# Patient Record
Sex: FEMALE | Race: WHITE | URBAN - METROPOLITAN AREA
[De-identification: names, ages, dates, MRNs, and addresses within clinical notes are randomized per-mention and may not be internally consistent; named-entity substitution may affect disease eponyms.]

---

## 2017-01-09 DIAGNOSIS — F41.1 GAD (GENERALIZED ANXIETY DISORDER): Primary | ICD-10-CM

## 2017-01-24 ENCOUNTER — OFFICE VISIT (OUTPATIENT)
Dept: FAMILY MEDICINE | Facility: CLINIC | Age: 22
End: 2017-01-24

## 2017-01-24 VITALS
DIASTOLIC BLOOD PRESSURE: 73 MMHG | WEIGHT: 182 LBS | HEIGHT: 71 IN | BODY MASS INDEX: 25.48 KG/M2 | HEART RATE: 73 BPM | SYSTOLIC BLOOD PRESSURE: 121 MMHG

## 2017-01-24 DIAGNOSIS — F41.1 GAD (GENERALIZED ANXIETY DISORDER): Primary | ICD-10-CM

## 2017-01-24 RX ORDER — FLUOXETINE 40 MG/1
40 CAPSULE ORAL DAILY
Qty: 93 CAPSULE | Refills: 1 | Status: SHIPPED | OUTPATIENT
Start: 2017-01-24

## 2017-01-24 NOTE — PROGRESS NOTES
"S:  F/u ALVIN, depression and binge eating.  -She has increased the Prozac successfully to 40mg qday without side effects and is feeling well.   -Seeing Dr. Daja Ling next week  -School and training are good so far  -Happy with how she is feeling and her mood at this time      O:  NAd  /73 mmHg  Pulse 73  Ht 1.803 m (5' 11\")  Wt 82.555 kg (182 lb)  BMI 25.40 kg/m2  LMP  (LMP Unknown)  Bright affect and eye contact is good  Normal thought content  Well groomed      ASSESSMENT:    1.  Generalized anxiety disorder.    2.  History of depression, doing well on Prozac 30 mg q. day.     3.  Binge eating, doing well on Prozac as well.      PLAN:  Refill given for 3 months  Recheck near the end of the semester  See Dr. Ling as directed by her      Laurence Ramirez, ATC was present for the entire appointment.      Laverne Montague MD, CAQ, FACSM, CCD  [facility]  Sports Medicine and Bone Health  Team Physician;  Athletics    "

## 2017-01-24 NOTE — Clinical Note
"  1/24/2017      RE: Ruchi Tobias  [id number]  [address]  [facility] [id number]       S:  F/u ALVIN, depression and binge eating.  -She has increased the Prozac successfully to 40mg qday without side effects and is feeling well.   -Seeing Dr. Daja Ling next week  -School and training are good so far  -Happy with how she is feeling and her mood at this time      O:  NAd  /73 mmHg  Pulse 73  Ht 1.803 m (5' 11\")  Wt 82.555 kg (182 lb)  BMI 25.40 kg/m2  LMP  (LMP Unknown)  Bright affect and eye contact is good  Normal thought content  Well groomed      ASSESSMENT:    1.  Generalized anxiety disorder.    2.  History of depression, doing well on Prozac 30 mg q. day.     3.  Binge eating, doing well on Prozac as well.      PLAN:  Refill given for 3 months  Recheck near the end of the semester  See Dr. Ling as directed by her      Laurence Ramirez, ATC was present for the entire appointment.      Laverne Montague MD, CAQ, FACSM, CCD  [facility]  Sports Medicine and Bone Health  Team Physician;  Athletics        Laverne Montague MD    "

## 2017-01-24 NOTE — Clinical Note
Date:January 25, 2017      Patient was self referred, no letter generated. Do not send.        [facility] Health Information

## 2017-01-26 ENCOUNTER — TELEPHONE (OUTPATIENT)
Dept: GASTROENTEROLOGY | Facility: CLINIC | Age: 22
End: 2017-01-26

## 2017-02-21 ENCOUNTER — TELEPHONE (OUTPATIENT)
Dept: NURSING | Facility: CLINIC | Age: 22
End: 2017-02-21

## 2017-02-21 ENCOUNTER — OFFICE VISIT (OUTPATIENT)
Dept: FAMILY MEDICINE | Facility: CLINIC | Age: 22
End: 2017-02-21
Payer: COMMERCIAL

## 2017-02-21 VITALS
SYSTOLIC BLOOD PRESSURE: 127 MMHG | OXYGEN SATURATION: 100 % | HEIGHT: 71 IN | TEMPERATURE: 98.7 F | BODY MASS INDEX: 26.15 KG/M2 | WEIGHT: 186.8 LBS | HEART RATE: 91 BPM | DIASTOLIC BLOOD PRESSURE: 75 MMHG

## 2017-02-21 DIAGNOSIS — L03.314 CELLULITIS OF GROIN: Primary | ICD-10-CM

## 2017-02-21 PROCEDURE — 87186 SC STD MICRODIL/AGAR DIL: CPT | Performed by: PHYSICIAN ASSISTANT

## 2017-02-21 PROCEDURE — 87529 HSV DNA AMP PROBE: CPT | Performed by: PHYSICIAN ASSISTANT

## 2017-02-21 PROCEDURE — 87070 CULTURE OTHR SPECIMN AEROBIC: CPT | Performed by: PHYSICIAN ASSISTANT

## 2017-02-21 PROCEDURE — 87077 CULTURE AEROBIC IDENTIFY: CPT | Performed by: PHYSICIAN ASSISTANT

## 2017-02-21 PROCEDURE — 99213 OFFICE O/P EST LOW 20 MIN: CPT | Performed by: PHYSICIAN ASSISTANT

## 2017-02-21 RX ORDER — CEPHALEXIN 500 MG/1
500 CAPSULE ORAL 3 TIMES DAILY
Qty: 30 CAPSULE | Refills: 0 | Status: SHIPPED | OUTPATIENT
Start: 2017-02-21 | End: 2017-03-02

## 2017-02-21 RX ORDER — CEPHALEXIN 500 MG/1
500 CAPSULE ORAL 3 TIMES DAILY
Qty: 30 CAPSULE | Refills: 0 | Status: SHIPPED | OUTPATIENT
Start: 2017-02-21 | End: 2017-02-21

## 2017-02-21 NOTE — PROGRESS NOTES
"  SUBJECTIVE:                                                    Ruchi Tobias is a 21 year old female who presents to clinic today for the following health issues:    Concern - Ingrown hair/ lump     Onset: \"Long time\"    Description:     Cyst in groin area, lump is \"opened\". Had to have one removed before on the opposite side    Intensity: mild    Progression of Symptoms:  same    Accompanying Signs & Symptoms:  Open sore and draining puss       Previous history of similar problem:   Yes on other side    Precipitating factors:   Worsened by: Nothing    Alleviating factors:  Improved by: Nothing       Therapies Tried and outcome: None        Problem list and histories reviewed & adjusted, as indicated.  Additional history: as documented    ROS:  C: NEGATIVE for fever, chills, change in weight  E/M: NEGATIVE for ear, mouth and throat problems  R: NEGATIVE for significant cough or SOB  CV: NEGATIVE for chest pain, palpitations or peripheral edema    Patient Active Problem List   Diagnosis     Irritable bowel syndrome     History reviewed. No pertinent past surgical history.    Social History   Substance Use Topics     Smoking status: Never Smoker     Smokeless tobacco: Never Used     Alcohol use No     Family History   Problem Relation Age of Onset     Coronary Artery Disease Maternal Grandfather      Depression Sister            Problem list, Medication list, Allergies, and Medical/Social/Surgical histories reviewed in [facility] and updated as appropriate.  Labs reviewed in EPIC    OBJECTIVE:                                                    /75  Pulse 91  Temp 98.7  F (37.1  C) (Oral)  Ht 5' 11\" (1.803 m)  Wt 186 lb 12.8 oz (84.7 kg)  LMP  (LMP Unknown)  SpO2 100%  BMI 26.05 kg/m2 Body mass index is 26.05 kg/(m^2).   GEN: Pt in no acute distress.  HEENT: AT, NC, eyes and ears normal, throat normal.  NODES: no anterior cervical LA, no supraclavicular LA  HEART: Regular rate and rhythm without murmurs, rubs " or gallops  LUNGS: Clear to auscultation  SKIN:  Right inner groin with approximately 5-7 mm red raised lump, draining clear fluid. No lymphangitis, fluctuance, induration, bleeding or discharge       ASSESSMENT/PLAN:                                                        ICD-10-CM    1. Cellulitis of groin L03.314 HSV 1 and 2 DNA by PCR     Wound Culture Aerobic Bacterial     cephALEXin (KEFLEX) 500 MG capsule     No need to drain. See plan below. Wound cultures taken. Return to clinic for any new or worsening symptoms or go to ER Urgent care in off hours    Patient Instructions     Keep area clean and dry  Wash with antibacterial soap once daily  Keep bacitrain and sterile bandage on  Avoid tight fitting clothes  If symptoms worsen, start keflex  If still no improvement, come back for recheck  Return to clinic for any new or worsening symptoms or go to ER Urgent care in off hours    Discharge Instructions for Cellulitis  You have been diagnosed with cellulitis. This is an infection in the deepest layer of the skin. In some cases, the infection also affects the muscle. Cellulitis is caused by bacteria. The bacteria can enter the body through broken skin. This can happen with a cut, scratch, animal bite, or an insect bite that has been scratched. You may have been treated in the hospital with antibiotics and fluids. You will likely be given a prescription for antibiotics to take at home. This sheet will help you take care of yourself at home.  Home Care  When you are home:    Take the prescribed antibiotic medication you are given as directed until it is gone. Take it even if you feel better. It treats the infection and stops it from returning. Not taking all of the medication can make future infections hard to treat.    Keep the infected area clean.    When possible, raise the infected area above the level of your heart. This helps keep swelling down.    Talk to your doctor if you are in pain. Ask what kind of  "over-the-counter medication you can take for pain.    Apply clean bandages as advised.    Take your temperature once a day for a week.    Wash your hands often to prevent spreading the infection.  In the future, wash your hands before and after you touch cuts, scratches, or bandages. This will help prevent infection.   When to Call Your Doctor  Call your doctor immediately if you have any of the following:    Vomiting    Fever of100.4 F (38 C) or higher, or as directed by your health care provider    Shaking chills    Redness that gets worse in or around the infected area    Swelling of the infected area    Pain that gets worse in or around the infected area    Difficulty or pain when moving the joints above or below the infected area    Discharge or pus draining from the area     9735-8934 [employer]. [address] [place] [address]. All rights reserved. This information is not intended as a substitute for professional medical care. Always follow your healthcare professional's instructions.              Estimated body mass index is 26.05 kg/(m^2) as calculated from the following:    Height as of this encounter: 5' 11\" (1.803 m).    Weight as of this encounter: 186 lb 12.8 oz (84.7 kg).       Helen Lagunas [facility]      "

## 2017-02-21 NOTE — MR AVS SNAPSHOT
After Visit Summary   2/21/2017    Ruchi Tobias    MRN: [id number]           Patient Information     Date Of Birth          1995        Visit Information        Provider Department      2/21/2017 2:00 PM Helen Wilson PA-C [facility]        Today's Diagnoses     Cyst of vagina    -  1    Screening examination for venereal disease        Need for HPV vaccine        Need for prophylactic vaccination and inoculation against influenza        Need for prophylactic vaccination with tetanus-diphtheria (TD)          Care Instructions    Keep area clean and dry  Wash with antibacterial soap once daily  Keep bacitrain and sterile bandage on  Avoid tight fitting clothes  If symptoms worsen, start keflex  If still no improvement, come back for recheck  Return to clinic for any new or worsening symptoms or go to ER Urgent care in off hours    Discharge Instructions for Cellulitis  You have been diagnosed with cellulitis. This is an infection in the deepest layer of the skin. In some cases, the infection also affects the muscle. Cellulitis is caused by bacteria. The bacteria can enter the body through broken skin. This can happen with a cut, scratch, animal bite, or an insect bite that has been scratched. You may have been treated in the hospital with antibiotics and fluids. You will likely be given a prescription for antibiotics to take at home. This sheet will help you take care of yourself at home.  Home Care  When you are home:    Take the prescribed antibiotic medication you are given as directed until it is gone. Take it even if you feel better. It treats the infection and stops it from returning. Not taking all of the medication can make future infections hard to treat.    Keep the infected area clean.    When possible, raise the infected area above the level of your heart. This helps keep swelling down.    Talk to your doctor if you are in pain. Ask what kind of  over-the-counter medication you can take for pain.    Apply clean bandages as advised.    Take your temperature once a day for a week.    Wash your hands often to prevent spreading the infection.  In the future, wash your hands before and after you touch cuts, scratches, or bandages. This will help prevent infection.   When to Call Your Doctor  Call your doctor immediately if you have any of the following:    Vomiting    Fever of100.4 F (38 C) or higher, or as directed by your health care provider    Shaking chills    Redness that gets worse in or around the infected area    Swelling of the infected area    Pain that gets worse in or around the infected area    Difficulty or pain when moving the joints above or below the infected area    Discharge or pus draining from the area     3766-5115 [employer]. [address] [place] [address], [place] [address]. All rights reserved. This information is not intended as a substitute for professional medical care. Always follow your healthcare professional's instructions.              Follow-ups after your visit        Who to contact     If you have questions or need follow up information about today's clinic visit or your schedule please contact [facility] directly at [phone].  Normal or non-critical lab and imaging results will be communicated to you by MyChart, letter or phone within 4 business days after the clinic has received the results. If you do not hear from us within 7 days, please contact the clinic through [employer] or phone. If you have a critical or abnormal lab result, we will notify you by phone as soon as possible.  Submit refill requests through [employer] or call your pharmacy and they will forward the refill request to us. Please allow 3 business days for your refill to be completed.          Additional Information About Your Visit        [employer] Information     [employer] gives you secure access to your electronic health record. If you  "see a primary care provider, you can also send messages to your care team and make appointments. If you have questions, please call your primary care clinic.  If you do not have a primary care provider, please call [phone] and they will assist you.        Care EveryWhere ID     This is your Care EveryWhere ID. This could be used by other organizations to access your [facility] medical records  [id number]        Your Vitals Were     Pulse Temperature Height Last Period Pulse Oximetry BMI (Body Mass Index)    91 98.7  F (37.1  C) (Oral) 5' 11\" (1.803 m) (LMP Unknown) 100% 26.05 kg/m2       Blood Pressure from Last 3 Encounters:   02/21/17 127/75   01/24/17 121/73   12/13/16 121/68    Weight from Last 3 Encounters:   02/21/17 186 lb 12.8 oz (84.7 kg)   01/24/17 182 lb (82.6 kg)   12/13/16 190 lb (86.2 kg)              We Performed the Following     HSV 1 and 2 DNA by PCR     Wound Culture Aerobic Bacterial          Today's Medication Changes          These changes are accurate as of: 2/21/17  2:32 PM.  If you have any questions, ask your nurse or doctor.               Start taking these medicines.        Dose/Directions    cephALEXin 500 MG capsule   Commonly known as:  KEFLEX   Used for:  Cyst of vagina   Started by:  Helen Wilson PA-C        Dose:  500 mg   Take 1 capsule (500 mg) by mouth 3 times daily   Quantity:  30 capsule   Refills:  0            Where to get your medicines      Some of these will need a paper prescription and others can be bought over the counter.  Ask your nurse if you have questions.     Bring a paper prescription for each of these medications     cephALEXin 500 MG capsule                Primary Care Provider    None Specified       No primary provider on file.        Thank you!     Thank you for choosing [facility]  for your care. Our goal is always to provide you with excellent care. Hearing back from our patients is one way we can continue to improve " our services. Please take a few minutes to complete the written survey that you may receive in the mail after your visit with us. Thank you!             Your Updated Medication List - Protect others around you: Learn how to safely use, store and throw away your medicines at www.disposemymeds.org.          This list is accurate as of: 2/21/17  2:32 PM.  Always use your most recent med list.                   Brand Name Dispense Instructions for use    cephALEXin 500 MG capsule    KEFLEX    30 capsule    Take 1 capsule (500 mg) by mouth 3 times daily       * FLUoxetine 10 MG tablet    PROzac    90 tablet    Take 1 tablet (10 mg) by mouth daily       * FLUoxetine 20 MG capsule    PROzac    90 capsule    Take 1 capsule (20 mg) by mouth daily       * FLUoxetine 40 MG capsule    PROzac    93 capsule    Take 1 capsule (40 mg) by mouth daily       levonorgestrel 20 MCG/24HR IUD    MIRENA     1 each by Intrauterine route once       * Notice:  This list has 3 medication(s) that are the same as other medications prescribed for you. Read the directions carefully, and ask your doctor or other care provider to review them with you.

## 2017-02-21 NOTE — PATIENT INSTRUCTIONS
Keep area clean and dry  Wash with antibacterial soap once daily  Keep bacitrain and sterile bandage on  Avoid tight fitting clothes  If symptoms worsen, start keflex  If still no improvement, come back for recheck  Return to clinic for any new or worsening symptoms or go to ER Urgent care in off hours    Discharge Instructions for Cellulitis  You have been diagnosed with cellulitis. This is an infection in the deepest layer of the skin. In some cases, the infection also affects the muscle. Cellulitis is caused by bacteria. The bacteria can enter the body through broken skin. This can happen with a cut, scratch, animal bite, or an insect bite that has been scratched. You may have been treated in the hospital with antibiotics and fluids. You will likely be given a prescription for antibiotics to take at home. This sheet will help you take care of yourself at home.  Home Care  When you are home:    Take the prescribed antibiotic medication you are given as directed until it is gone. Take it even if you feel better. It treats the infection and stops it from returning. Not taking all of the medication can make future infections hard to treat.    Keep the infected area clean.    When possible, raise the infected area above the level of your heart. This helps keep swelling down.    Talk to your doctor if you are in pain. Ask what kind of over-the-counter medication you can take for pain.    Apply clean bandages as advised.    Take your temperature once a day for a week.    Wash your hands often to prevent spreading the infection.  In the future, wash your hands before and after you touch cuts, scratches, or bandages. This will help prevent infection.   When to Call Your Doctor  Call your doctor immediately if you have any of the following:    Vomiting    Fever of100.4 F (38 C) or higher, or as directed by your health care provider    Shaking chills    Redness that gets worse in or around the infected area    Swelling of  the infected area    Pain that gets worse in or around the infected area    Difficulty or pain when moving the joints above or below the infected area    Discharge or pus draining from the area     5939-0684 [employer]. [address] [place] [address], [place] [address]. All rights reserved. This information is not intended as a substitute for professional medical care. Always follow your healthcare professional's instructions.

## 2017-02-21 NOTE — NURSING NOTE
"Chief Complaint   Patient presents with     Hair/Scalp Problem       Initial /75  Pulse 91  Temp 98.7  F (37.1  C) (Oral)  Ht 5' 11\" (1.803 m)  Wt 186 lb 12.8 oz (84.7 kg)  LMP  (LMP Unknown)  SpO2 100%  BMI 26.05 kg/m2 Estimated body mass index is 26.05 kg/(m^2) as calculated from the following:    Height as of this encounter: 5' 11\" (1.803 m).    Weight as of this encounter: 186 lb 12.8 oz (84.7 kg).  Medication Reconciliation: complete   Freida Ren MA      "

## 2017-02-21 NOTE — TELEPHONE ENCOUNTER
"Call Type: Triage Call    Presenting Problem: Ruchi is requesting to see GYN MD.  Ruchi  feels that she has an \" ingrown hair in groin area.\"   Ruchi has  an bump in vaginal area.    [facility] Triage/Skin Lesions/disposition is  to be seen within 24 hours and Ruchi agreed.  Triage Note:  Guideline Title: Skin Lesions  Recommended Disposition: See Provider within 24 hours  Original Inclination: Wanted to speak with a nurse  Override Disposition:  Intended Action: Call PCP/HCP  Physician Contacted: No  Painful lesion(s) unrelieved with 24 hours of home care measures ?  YES  Thermal or chemical burn ? NO  Blisters on mouth OR surrounding area ? NO  Known or suspected exposure to Poison [place], [place] OR [place] ? NO  Skin tear(s) caused by friction/shear injury ? NO  Previously confirmed diagnosis of athlete's foot and similar symptoms ? NO  One or more enlarged or tender lymph nodes ? NO  Associated with new onset wheezing or difficulty breathing ? NO  Exposure to , e.g., Super Glue ? NO  Possible exposure to chickenpox or has rash that looks like chickenpox ? NO  Female with any rash, warts or sores on perineal/perianal area. ? NO  Any painful blisters on perineal/perianal area. ? NO  Male with rash, warts, or sores on penis. ? NO  Male with rash, warts, or sores on scrotum/genital area. ? NO  Non-blister lesions on mouth, lip, tongue ? NO  Skin changes that looks like hives (itchy welts or wheals) ? NO  Jaundice (yellowish tint to skin or whites of eyes) that is worsening or not  previously evaluated. ? NO  Large areas of peeling skin or new onset of large blisters over body not related  to sun/UVB exposure ? NO  Foot or toe skin lesions complicated by diagnosed diabetes mellitus. ? NO  Localized or widespread rash that does not have the appearance of hives (itchy  welts or wheals) ? NO  Any temperature elevation in an immunocompromised individual OR frail elderly with  signs of dehydration ? NO  Any new " OR worsening signs and symptoms of soft tissue infection ? NO  Known herpes zoster or undiagnosed blister-like rash on or near eye ? NO  Known herpes zoster or undiagnosed blister-like rash on tip of nose ? NO  Physician Instructions:  Care Advice: Keep any pressure off of the affected area, be careful to avoid  injury to the area.  During pregnancy, call provider if temperature is 100 F (37.7 C) or greater  OR any temperature elevation for 3 days even while taking acetaminophen.  CAUTIONS  SYMPTOM / CONDITION MANAGEMENT  Apply heat or cold, whichever feels better, to the affected area. Try a  heating pad on low or a warm compress. Or use a cloth-covered ice pack,  cold gel pack or a cold compress. Apply either for 15-20 minutes every 2-3  hours while awake.  Analgesic/Antipyretic Advice - NSAIDs: Consider aspirin, ibuprofen,  naproxen or ketoprofen for pain or fever as directed on label or by  pharmacist/provider. PRECAUTIONS: - You should not take this medicine for  more than 10 days unless recommended by your provider. EXCEPTIONS: - Should  not be used if taking blood thinners or have bleeding problems. - Do not  use if have history of sensitivity/allergy to any of these medications  or history of cardiovascular, ulcer, kidney, liver disease or diabetes  unless approved by provider. - Do not exceed recommended dose or frequency.  Analgesic/Antipyretic Advice - Acetaminophen: Consider acetaminophen as  directed on label or by pharmacist/provider for pain or fever. PRECAUTIONS:  - Use if there is no history of liver disease, alcoholism, or intake of  three or more alcohol drinks per day - Only if approved by provider during  pregnancy or when breastfeeding - Do not exceed recommended dose or  frequency. Do not take more than 3000 milligrams (mg) in 24 hours. Do not  take this medicine for more than 10 days unless recommended by your  provider. - During pregnancy, acetaminophen should not be taken more than  3  consecutive days without telling provider - To make sure you don't take too  much, check other medicines you take to see if they also contain  acetaminophen.  See a provider within 4 hours if having new signs or symptoms of soft  tissue infection (such as redness, warmth, tenderness, swelling  may have drainage).

## 2017-02-24 ENCOUNTER — HOSPITAL ENCOUNTER (EMERGENCY)
Facility: CLINIC | Age: 22
Discharge: HOME | End: 2017-02-24
Attending: EMERGENCY MEDICINE | Admitting: EMERGENCY MEDICINE
Payer: COMMERCIAL

## 2017-02-24 VITALS
OXYGEN SATURATION: 96 % | HEIGHT: 71 IN | TEMPERATURE: 99.7 F | SYSTOLIC BLOOD PRESSURE: 126 MMHG | DIASTOLIC BLOOD PRESSURE: 85 MMHG

## 2017-02-24 DIAGNOSIS — J02.9 VIRAL PHARYNGITIS: ICD-10-CM

## 2017-02-24 LAB
DEPRECATED S PYO AG THROAT QL EIA: NORMAL
HSV1 DNA SPEC QL NAA+PROBE: NEGATIVE
HSV2 DNA SPEC QL NAA+PROBE: NORMAL
MICRO REPORT STATUS: NORMAL
SPECIMEN SOURCE: NORMAL
SPECIMEN SOURCE: NORMAL

## 2017-02-24 PROCEDURE — 99283 EMERGENCY DEPT VISIT LOW MDM: CPT | Performed by: EMERGENCY MEDICINE

## 2017-02-24 PROCEDURE — 99283 EMERGENCY DEPT VISIT LOW MDM: CPT | Mod: Z6 | Performed by: EMERGENCY MEDICINE

## 2017-02-24 PROCEDURE — 25000131 ZZH RX MED GY IP 250 OP 636 PS 637: Performed by: EMERGENCY MEDICINE

## 2017-02-24 PROCEDURE — 87081 CULTURE SCREEN ONLY: CPT | Performed by: EMERGENCY MEDICINE

## 2017-02-24 PROCEDURE — 87880 STREP A ASSAY W/OPTIC: CPT | Performed by: EMERGENCY MEDICINE

## 2017-02-24 RX ORDER — DEXAMETHASONE 4 MG/1
8 TABLET ORAL ONCE
Status: COMPLETED | OUTPATIENT
Start: 2017-02-24 | End: 2017-02-24

## 2017-02-24 RX ADMIN — DEXAMETHASONE 8 MG: 4 TABLET ORAL at 05:12

## 2017-02-24 ASSESSMENT — ENCOUNTER SYMPTOMS
ABDOMINAL PAIN: 0
SORE THROAT: 1
FEVER: 0
SHORTNESS OF BREATH: 0

## 2017-02-24 NOTE — ED PROVIDER NOTES
"  History     Chief Complaint   Patient presents with     Pharyngitis     swollen     HPI  Ruchi Tobias is a 21 year old female who presents with sore throat.  This been present for the last couple of days.  Has been getting worse.  Tonight felt like her throat was getting more swollen.  Has had change in voice.  Has had chills but no fever.  Has a history of recurrent strep throat.  No cough.  No shortness of breath.  No stridor.    I have reviewed the Medications, Allergies, Past Medical and Surgical History, and Social History in the [employer] system.  History reviewed. No pertinent past medical history.    History reviewed. No pertinent past surgical history.    Family History   Problem Relation Age of Onset     Coronary Artery Disease Maternal Grandfather      Depression Sister        Social History   Substance Use Topics     Smoking status: Never Smoker     Smokeless tobacco: Never Used     Alcohol use No      Review of Systems   Constitutional: Negative for fever.   HENT: Positive for sore throat.    Respiratory: Negative for shortness of breath.    Cardiovascular: Negative for chest pain.   Gastrointestinal: Negative for abdominal pain.   All other systems reviewed and are negative.      Physical Exam   BP: 138/85  Temp: 99.7  F (37.6  C)  Height: 180.3 cm (5' 11\")  SpO2: 97 %  Physical Exam  Vital Signs and Nursing Notes Reviewed.  General:  NAD  HEENT: for erythematous tonsils.  Uvula midline.   Some purulence noted.  No obvious signs of trauma.  PERRL.  EOMI  Neck: Supple.  No lymphadenopathy.  Cardiac: RRR.  No murmurs, rubs or gallops  Lungs: Clear bilaterally.  Normal respiratory rate.    Abdomen:  Soft, Nontender, no rebound or guarding.  Back: No CVA tenderness.  Skin:  No rash.  No diaphoresis  Extremities:  No cyanosis, clubbing, or edema.  Neurological:  CN II-XII intact, Strength intact, Sensation intact, No pronator drift, Normal gait.  Pulse:  Symmetric in bilateral upper and lower " extremities.    ED Course     ED Course     Procedures             Critical Care time:  none               Labs Ordered and Resulted from Time of ED Arrival Up to the Time of Departure from the ED   RAPID STREP SCREEN   BETA STREP GROUP A CULTURE       Assessments & Plan (with Medical Decision Making)  21 year old who presents with sore throat.  Strep test negative.  No signs of abscess.  Given dose of Decadron here.  She will do supportive care at home.  She will return for worsening symptoms.  No signs of airway, my care.  She will follow-up with [facility] if not improved.         I have reviewed the nursing notes.    I have reviewed the findings, diagnosis, plan and need for follow up with the patient.    New Prescriptions    No medications on file       Final diagnoses:   Viral pharyngitis       2/24/2017   [facility], [place], EMERGENCY DEPARTMENT     Asif Epperson MD  02/24/17 7951

## 2017-02-24 NOTE — ED AVS SNAPSHOT
[facility], Emergency Department    [address] [address]    Phone:  [phone]                                       Ruchi Tobias   MRN: [id number]    Department:  [facility], Emergency Department   Date of Visit:  2/24/2017           Patient Information     Date Of Birth          1995        Your diagnoses for this visit were:     Viral pharyngitis        You were seen by Asif Epperson MD.        Discharge Instructions         Please make an appointment to follow up with [facility] (phone: [phone]) in 5-7 days   Viral Pharyngitis (Sore Throat)    You (or your child, if your child is the patient) have pharyngitis (sore throat). This infection is caused by a virus. It can cause throat pain that is worse when swallowing, aching all over, headache, and fever. The infection may be spread by coughing, kissing, or touching others after touching your mouth or nose. Antibiotic medications do not work against viruses, so they are not used for treating this condition.  Home care    If your symptoms are severe, rest at home. Return to work or school when you feel well enough.     Drink plenty of fluids to avoid dehydration.    For children: Use acetaminophen for fever, fussiness or discomfort. In infants over six months of age, you may use ibuprofen instead of acetaminophen. (NOTE: If your child has chronic liver or kidney disease or ever had a stomach ulcer or GI bleeding, talk with your doctor before using these medicines.) (NOTE: Aspirin should never be used in anyone under 18 years of age who is ill with a fever. It may cause severe liver damage.)     For adults: You may use acetaminophen or ibuprofen to control pain or fever, unless another medicine was prescribed for this. (NOTE: If you have chronic liver or kidney disease or ever had a stomach ulcer or GI bleeding, talk with your doctor before using these medicines.)    Throat lozenges or numbing throat  sprays can help reduce pain. Gargling with warm salt water will also help reduce throat pain. For this, dissolve 1/2 teaspoon of salt in 1 glass of warm water. To help soothe a sore throat, children can sip on juice or a popsicle. Children 5 years and older can also suck on a lollipop or hard candy.    Avoid salty or spicy foods, which can be irritating to the throat.  Follow-up care  Follow up with your healthcare provider or our staff if you are not improving over the next week.  When to seek medical advice  Call your healthcare provider right away if any of these occur:    Fever as directed by your doctor.  For children, seek care if:    Your child is of any age and has repeated fevers above 104 F (40 C).    Your child is younger than 2 years of age and has a fever of 100.4 F (38 C) that continues for more than 1 day.    Your child is 2 years old or older and has a fever of 100.4 F (38 C) that continues for more than 3 days.    New or worsening ear pain, sinus pain, or headache    Painful lumps in the back of neck    Stiff neck    Lymph nodes are getting larger    Inability to swallow liquids, excessive drooling, or inability to open mouth wide due to throat pain    Signs of dehydration (very dark urine or no urine, sunken eyes, dizziness)    Trouble breathing or noisy breathing    Muffled voice    New rash    Child appears to be getting sicker    1655-2157 [employer]. [address] [place] [address]. All rights reserved. This information is not intended as a substitute for professional medical care. Always follow your healthcare professional's instructions.          24 Hour Appointment Hotline       To make an appointment at any [facility], call [phone] ([phone]). If you don't have a family doctor or clinic, we will help you find one. [facility] clinics are conveniently located to serve the needs of you and your family.             Review of your medicines      Our  records show that you are taking the medicines listed below. If these are incorrect, please call your family doctor or clinic.        Dose / Directions Last dose taken    cephALEXin 500 MG capsule   Commonly known as:  KEFLEX   Dose:  500 mg   Quantity:  30 capsule        Take 1 capsule (500 mg) by mouth 3 times daily   Refills:  0        * FLUoxetine 10 MG tablet   Commonly known as:  PROzac   Dose:  10 mg   Quantity:  90 tablet        Take 1 tablet (10 mg) by mouth daily   Refills:  1        * FLUoxetine 20 MG capsule   Commonly known as:  PROzac   Dose:  20 mg   Quantity:  90 capsule        Take 1 capsule (20 mg) by mouth daily   Refills:  1        * FLUoxetine 40 MG capsule   Commonly known as:  PROzac   Dose:  40 mg   Quantity:  93 capsule        Take 1 capsule (40 mg) by mouth daily   Refills:  1        levonorgestrel 20 MCG/24HR IUD   Commonly known as:  MIRENA   Dose:  1 each        1 each by Intrauterine route once   Refills:  0        * Notice:  This list has 3 medication(s) that are the same as other medications prescribed for you. Read the directions carefully, and ask your doctor or other care provider to review them with you.            Procedures and tests performed during your visit     Beta strep group A culture    Rapid strep screen      Orders Needing Specimen Collection     None      Pending Results     Date and Time Order Name Status Description    2/24/2017 0423 Beta strep group A culture In process             Pending Culture Results     Date and Time Order Name Status Description    2/24/2017 0423 Beta strep group A culture In process             Thank you for choosing [facility]       Thank you for choosing [facility] for your care. Our goal is always to provide you with excellent care. Hearing back from our patients is one way we can continue to improve our services. Please take a few minutes to complete the written survey that you may receive in the mail after you visit with us. Thank you!         [employer] Information     [employer] gives you secure access to your electronic health record. If you see a primary care provider, you can also send messages to your care team and make appointments. If you have questions, please call your primary care clinic.  If you do not have a primary care provider, please call [phone] and they will assist you.        Care EveryWhere ID     This is your Care EveryWhere ID. This could be used by other organizations to access your Mobile medical records  [id number]        After Visit Summary       This is your record. Keep this with you and show to your community pharmacist(s) and doctor(s) at your next visit.

## 2017-02-24 NOTE — DISCHARGE INSTRUCTIONS
Please make an appointment to follow up with [facility] (phone: [phone]) in 5-7 days   Viral Pharyngitis (Sore Throat)    You (or your child, if your child is the patient) have pharyngitis (sore throat). This infection is caused by a virus. It can cause throat pain that is worse when swallowing, aching all over, headache, and fever. The infection may be spread by coughing, kissing, or touching others after touching your mouth or nose. Antibiotic medications do not work against viruses, so they are not used for treating this condition.  Home care    If your symptoms are severe, rest at home. Return to work or school when you feel well enough.     Drink plenty of fluids to avoid dehydration.    For children: Use acetaminophen for fever, fussiness or discomfort. In infants over six months of age, you may use ibuprofen instead of acetaminophen. (NOTE: If your child has chronic liver or kidney disease or ever had a stomach ulcer or GI bleeding, talk with your doctor before using these medicines.) (NOTE: Aspirin should never be used in anyone under 18 years of age who is ill with a fever. It may cause severe liver damage.)     For adults: You may use acetaminophen or ibuprofen to control pain or fever, unless another medicine was prescribed for this. (NOTE: If you have chronic liver or kidney disease or ever had a stomach ulcer or GI bleeding, talk with your doctor before using these medicines.)    Throat lozenges or numbing throat sprays can help reduce pain. Gargling with warm salt water will also help reduce throat pain. For this, dissolve 1/2 teaspoon of salt in 1 glass of warm water. To help soothe a sore throat, children can sip on juice or a popsicle. Children 5 years and older can also suck on a lollipop or hard candy.    Avoid salty or spicy foods, which can be irritating to the throat.  Follow-up care  Follow up with your healthcare provider or our staff if you are not improving over the  next week.  When to seek medical advice  Call your healthcare provider right away if any of these occur:    Fever as directed by your doctor.  For children, seek care if:    Your child is of any age and has repeated fevers above 104 F (40 C).    Your child is younger than 2 years of age and has a fever of 100.4 F (38 C) that continues for more than 1 day.    Your child is 2 years old or older and has a fever of 100.4 F (38 C) that continues for more than 3 days.    New or worsening ear pain, sinus pain, or headache    Painful lumps in the back of neck    Stiff neck    Lymph nodes are getting larger    Inability to swallow liquids, excessive drooling, or inability to open mouth wide due to throat pain    Signs of dehydration (very dark urine or no urine, sunken eyes, dizziness)    Trouble breathing or noisy breathing    Muffled voice    New rash    Child appears to be getting sicker    3685-5779 [employer]. [address] [place] [address]. All rights reserved. This information is not intended as a substitute for professional medical care. Always follow your healthcare professional's instructions.

## 2017-02-24 NOTE — ED NOTES
Pt came in with sore throat, feels swollen, unable to take anything orally for last 12-24 hours. Temp here in ED 99.7.

## 2017-02-24 NOTE — ED AVS SNAPSHOT
[facility], [place], Emergency Department    [address] [place] [address]    Phone:  [phone]                                       Ruchi Tobias   MRN: [id number]    Department:  [facility], Emergency Department   Date of Visit:  2/24/2017           After Visit Summary Signature Page     I have received my discharge instructions, and my questions have been answered. I have discussed any challenges I see with this plan with the nurse or doctor.    ..........................................................................................................................................  Patient/Patient Representative Signature      ..........................................................................................................................................  Patient Representative Print Name and Relationship to Patient    ..................................................               ................................................  Date                                            Time    ..........................................................................................................................................  Reviewed by Signature/Title    ...................................................              ..............................................  Date                                                            Time

## 2017-02-25 LAB
BACTERIA SPEC CULT: ABNORMAL
Lab: ABNORMAL
MICRO REPORT STATUS: ABNORMAL
MICROORGANISM SPEC CULT: ABNORMAL
MICROORGANISM SPEC CULT: ABNORMAL
SPECIMEN SOURCE: ABNORMAL

## 2017-02-27 LAB
BACTERIA SPEC CULT: NORMAL
MICRO REPORT STATUS: NORMAL
SPECIMEN SOURCE: NORMAL

## 2017-03-02 ENCOUNTER — OFFICE VISIT (OUTPATIENT)
Dept: FAMILY MEDICINE | Facility: CLINIC | Age: 22
End: 2017-03-02

## 2017-03-02 VITALS
WEIGHT: 180.4 LBS | DIASTOLIC BLOOD PRESSURE: 76 MMHG | HEART RATE: 74 BPM | SYSTOLIC BLOOD PRESSURE: 129 MMHG | BODY MASS INDEX: 25.26 KG/M2 | HEIGHT: 71 IN

## 2017-03-02 DIAGNOSIS — J36 PERITONSILLAR ABSCESS: Primary | ICD-10-CM

## 2017-03-02 NOTE — LETTER
Date:March 20, 2017      Patient was self referred, no letter generated. Do not send.        [facility] Health Information

## 2017-03-02 NOTE — LETTER
3/2/2017      RE: Ruchi Tobias  [address]  [address]  [facility] [address]       I was not present at this visit, but I have discussed the above visit with the fellow. I agree with the above assessment and plan.  Supervising physician: [facility]    HPI      cc: sore throat f/u  - was seen in the ER on 2/24   - rapid strep at that time was negative  - was given a dose of decadron and told to f/u with student health  - symptoms worsened and she went to see another care this past Monday  - they found a peritonsillar abscess which was drained  - she was given a course of clindamycin afterwards  - presently denies any fevers  - symptoms are nearly resolved at this point  - has spoken with PCP who put an order in for a ENT referral given that she has had repeated episodes strep and now this abscess  - unsure if a C&S was sent at the urgent care    Medical History     No past surgical history on file.    No past medical history on file.    No Known Allergies    Social History     Social History     Marital status: Single     Spouse name: N/A     Number of children: N/A     Years of education: N/A     Occupational History     Not on file.     Social History Main Topics     Smoking status: Never Smoker     Smokeless tobacco: Never Used     Alcohol use No     Drug use: No     Sexual activity: Not Currently     Partners: Female     Birth control/ protection: IUD     Other Topics Concern     Not on file     Social History Narrative       Family History   Problem Relation Age of Onset     Coronary Artery Disease Maternal Grandfather      Depression Sister        Current Outpatient Prescriptions   Medication     cephALEXin (KEFLEX) 500 MG capsule     FLUoxetine (PROZAC) 40 MG capsule     FLUoxetine (PROZAC) 20 MG capsule     FLUoxetine (PROZAC) 10 MG tablet     levonorgestrel (MIRENA) 20 MCG/24HR IUD     No current facility-administered medications for this visit.            Objective  "Findings     Vitals:    03/02/17 0917   BP: 129/76   Pulse: 74   Weight: 180 lb 6.4 oz (81.8 kg)   Height: 5' 11\" (1.803 m)         Physical Exam:  Gen: A&O in NAD  HEENT: EOMI, w/o conjunctivitis, TM's pearly grey b/l, canals without erythema or exudate, no cervical lymphadenopathy, large L peritonsilar abscess noted which has no signs of erythema and appears to be resolving  CV: RRR wo m/r/g  Pulm:  CTA b/l wo w/r/r, without respiratory distress  Derm: no rashes or lesions  Psych: normal affect        Assessment / Plan     1) L Peritonsillar Abscess  - symptoms nearly resolved, afebrile  - finish abx course(clindamycin) given at the urgent care  - Rachel to call the urgent care to see if a C/S was run and update us with results if possible  - agree with PCP that ENT referral would be worthwhile given recurrent infections, pt to set this up with current referral placed by PCP, but we can re-enter a referral if needed    Ariana Kerr ATC, present for the entire encounter    Ruiz ARIELLE Gray.  Sports Medicine Fellow      Ruiz SUKUMAR Gray MD    "

## 2017-03-02 NOTE — MR AVS SNAPSHOT
"              After Visit Summary   3/2/2017    Ruchi Tobias    MRN: [id number]           Patient Information     Date Of Birth          1995        Visit Information        Provider Department      3/2/2017 9:15 AM Joseph Gray MD [facility]        Today's Diagnoses     Peritonsillar abscess    -  1       Follow-ups after your visit        Who to contact     Please call your clinic at [phone] to:    Ask questions about your health    Make or cancel appointments    Discuss your medicines    Learn about your test results    Speak to your doctor   If you have compliments or concerns about an experience at your clinic, or if you wish to file a complaint, please contact [facility] Patient Relations at [phone] or email us at [email or web address]         Additional Information About Your Visit        MyChart Information     [employer] gives you secure access to your electronic health record. If you see a primary care provider, you can also send messages to your care team and make appointments. If you have questions, please call your primary care clinic.  If you do not have a primary care provider, please call [phone] and they will assist you.      [employer] is an electronic gateway that provides easy, online access to your medical records. With [employer], you can request a clinic appointment, read your test results, renew a prescription or communicate with your care team.     To access your existing account, please contact your [facility] or call [phone] for assistance.        Care EveryWhere ID     This is your Care EveryWhere ID. This could be used by other organizations to access your [facility] medical records  [id number]        Your Vitals Were     Pulse Height BMI (Body Mass Index)             74 1.803 m (5' 11\") 25.16 kg/m2          Blood Pressure from Last 3 Encounters:   03/02/17 129/76 "   02/24/17 126/85   02/21/17 127/75    Weight from Last 3 Encounters:   03/02/17 81.8 kg (180 lb 6.4 oz)   02/21/17 84.7 kg (186 lb 12.8 oz)   01/24/17 82.6 kg (182 lb)              Today, you had the following     No orders found for display         Today's Medication Changes          These changes are accurate as of: 3/2/17 11:59 PM.  If you have any questions, ask your nurse or doctor.               Stop taking these medicines if you haven't already. Please contact your care team if you have questions.     cephALEXin 500 MG capsule   Commonly known as:  KEFLEX                    Primary Care Provider    [facility]                Thank you!     Thank you for choosing [facility]  for your care. Our goal is always to provide you with excellent care. Hearing back from our patients is one way we can continue to improve our services. Please take a few minutes to complete the written survey that you may receive in the mail after your visit with us. Thank you!             Your Updated Medication List - Protect others around you: Learn how to safely use, store and throw away your medicines at www.disposemymeds.org.          This list is accurate as of: 3/2/17 11:59 PM.  Always use your most recent med list.                   Brand Name Dispense Instructions for use    * FLUoxetine 10 MG tablet    PROzac    90 tablet    Take 1 tablet (10 mg) by mouth daily       * FLUoxetine 20 MG capsule    PROzac    90 capsule    Take 1 capsule (20 mg) by mouth daily       * FLUoxetine 40 MG capsule    PROzac    93 capsule    Take 1 capsule (40 mg) by mouth daily       levonorgestrel 20 MCG/24HR IUD    MIRENA     1 each by Intrauterine route once       * Notice:  This list has 3 medication(s) that are the same as other medications prescribed for you. Read the directions carefully, and ask your doctor or other care provider to review them with you.

## 2017-03-02 NOTE — PROGRESS NOTES
"HPI      cc: sore throat f/u  - was seen in the ER on 2/24   - rapid strep at that time was negative  - was given a dose of decadron and told to f/u with student health  - symptoms worsened and she went to see another care this past Monday  - they found a peritonsillar abscess which was drained  - she was given a course of clindamycin afterwards  - presently denies any fevers  - symptoms are nearly resolved at this point  - has spoken with PCP who put an order in for a ENT referral given that she has had repeated episodes strep and now this abscess  - unsure if a C&S was sent at the urgent care    Medical History     No past surgical history on file.    No past medical history on file.    No Known Allergies    Social History     Social History     Marital status: Single     Spouse name: N/A     Number of children: N/A     Years of education: N/A     Occupational History     Not on file.     Social History Main Topics     Smoking status: Never Smoker     Smokeless tobacco: Never Used     Alcohol use No     Drug use: No     Sexual activity: Not Currently     Partners: Female     Birth control/ protection: IUD     Other Topics Concern     Not on file     Social History Narrative       Family History   Problem Relation Age of Onset     Coronary Artery Disease Maternal Grandfather      Depression Sister        Current Outpatient Prescriptions   Medication     cephALEXin (KEFLEX) 500 MG capsule     FLUoxetine (PROZAC) 40 MG capsule     FLUoxetine (PROZAC) 20 MG capsule     FLUoxetine (PROZAC) 10 MG tablet     levonorgestrel (MIRENA) 20 MCG/24HR IUD     No current facility-administered medications for this visit.            Objective Findings     Vitals:    03/02/17 0917   BP: 129/76   Pulse: 74   Weight: 180 lb 6.4 oz (81.8 kg)   Height: 5' 11\" (1.803 m)         Physical Exam:  Gen: A&O in NAD  HEENT: EOMI, w/o conjunctivitis, TM's pearly grey b/l, canals without erythema or exudate, no cervical lymphadenopathy, large L " peritonsilar abscess noted which has no signs of erythema and appears to be resolving  CV: RRR wo m/r/g  Pulm:  CTA b/l wo w/r/r, without respiratory distress  Derm: no rashes or lesions  Psych: normal affect        Assessment / Plan     1) L Peritonsillar Abscess  - symptoms nearly resolved, afebrile  - finish abx course(clindamycin) given at the urgent care  - Rachel to call the urgent care to see if a C/S was run and update us with results if possible  - agree with PCP that ENT referral would be worthwhile given recurrent infections, pt to set this up with current referral placed by PCP, but we can re-enter a referral if needed    Ariana Kerr ATC, present for the entire encounter    Ruiz ARIELLE Gray.  Sports Medicine Fellow

## 2017-03-17 NOTE — PROGRESS NOTES
I was not present at this visit, but I have discussed the above visit with the fellow. I agree with the above assessment and plan.  Supervising physician: Josse Andrade [facility]

## 2017-03-21 ENCOUNTER — OFFICE VISIT (OUTPATIENT)
Dept: FAMILY MEDICINE | Facility: CLINIC | Age: 22
End: 2017-03-21

## 2017-03-21 VITALS
HEIGHT: 71 IN | BODY MASS INDEX: 25.87 KG/M2 | HEART RATE: 68 BPM | SYSTOLIC BLOOD PRESSURE: 118 MMHG | DIASTOLIC BLOOD PRESSURE: 72 MMHG | WEIGHT: 184.8 LBS

## 2017-03-21 DIAGNOSIS — R07.0 THROAT PAIN: Primary | ICD-10-CM

## 2017-03-21 NOTE — MR AVS SNAPSHOT
After Visit Summary   3/21/2017    Ruchi Tobias    MRN: [id number]           Patient Information     Date Of Birth          1995        Visit Information        Provider Department      3/21/2017 4:00 PM Laverne Montague MD [facility]        Today's Diagnoses     Throat pain    -  1       Follow-ups after your visit        Your next 10 appointments already scheduled     Apr 04, 2017  4:15 PM CDT   Return Visit with Shakeel Lynch MD   [facility] ([facility])    [address] [facility] [address]   [phone]              Who to contact     Please call your clinic at [phone] to:    Ask questions about your health    Make or cancel appointments    Discuss your medicines    Learn about your test results    Speak to your doctor   If you have compliments or concerns about an experience at your clinic, or if you wish to file a complaint, please contact [facility] Patient Relations at [phone] or email us at [email or web address]         Additional Information About Your Visit        MyChart Information     BIG Launchert gives you secure access to your electronic health record. If you see a primary care provider, you can also send messages to your care team and make appointments. If you have questions, please call your primary care clinic.  If you do not have a primary care provider, please call [phone] and they will assist you.      BIG Launchert is an electronic gateway that provides easy, online access to your medical records. With IfOnly, you can request a clinic appointment, read your test results, renew a prescription or communicate with your care team.     To access your existing account, please contact your [facility] or call [phone] for assistance.        Care EveryWhere ID     This is your Care EveryWhere ID. This could be used  "by other organizations to access your [place] medical records  [id number]        Your Vitals Were     Pulse Height BMI (Body Mass Index)             68 5' 11\" (1.803 m) 25.77 kg/m2          Blood Pressure from Last 3 Encounters:   03/27/17 (!) 118/93   03/21/17 118/72   03/02/17 129/76    Weight from Last 3 Encounters:   03/27/17 185 lb (83.9 kg)   03/21/17 184 lb 12.8 oz (83.8 kg)   03/02/17 180 lb 6.4 oz (81.8 kg)              Today, you had the following     No orders found for display       Primary Care Provider    Md Other Clinic                Thank you!     Thank you for choosing [facility]  for your care. Our goal is always to provide you with excellent care. Hearing back from our patients is one way we can continue to improve our services. Please take a few minutes to complete the written survey that you may receive in the mail after your visit with us. Thank you!             Your Updated Medication List - Protect others around you: Learn how to safely use, store and throw away your medicines at www.disposemymeds.org.          This list is accurate as of: 3/21/17 11:59 PM.  Always use your most recent med list.                   Brand Name Dispense Instructions for use    AMOXICILLIN PO          * FLUoxetine 10 MG tablet    PROzac    90 tablet    Take 1 tablet (10 mg) by mouth daily       * FLUoxetine 20 MG capsule    PROzac    90 capsule    Take 1 capsule (20 mg) by mouth daily       * FLUoxetine 40 MG capsule    PROzac    93 capsule    Take 1 capsule (40 mg) by mouth daily       levonorgestrel 20 MCG/24HR IUD    MIRENA     1 each by Intrauterine route once       * Notice:  This list has 3 medication(s) that are the same as other medications prescribed for you. Read the directions carefully, and ask your doctor or other care provider to review them with you.      "

## 2017-03-21 NOTE — LETTER
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  She recently was diagnosed with a peritonsillar abscess and this was drained near the end of February.  It seemed to be doing much better.  She was treated with an antibiotic.  She was out of town this week at a golf tournament over the weekend and her throat began to bother her again.  Her , Ariana, was contacted and she contacted me and I recommended that she be seen in an urgent care to have her throat evaluated.  She had bilateral symptoms.  They did not do this, however.  She chose to call her primary care physician who prescribed an antibiotic over the telephone.  She has been taking this antibiotic and overall is feeling improved.  No fevers or chills.  She is having just a small amount of difficulty swallowing but is greatly improved.  She was put on amoxicillin.  No other symptoms.  She now has an appointment scheduled [facility] in [place] tomorrow.      OBJECTIVE:  Pleasant, in no apparent distress.  Vital signs as listed.  HEENT is negative except for the following:  Her throat shows erythema bilaterally that is mild.  There is some mild tonsillar exudate bilaterally.  No significant swelling on the palate.  Her neck, she has cervical lymphadenopathy bilaterally that is mildly tender to palpation and is about 0.5 cm.  No posterior cervical lymphadenopathy.  Heart and lungs are clear.      ASSESSMENT:  Peritonsillar abscess x1 recently, now with recurrent pharyngitis that may be strep throat.      PLAN:  I have explained to her that bilateral peritonsillar abscess would be unusual.  She was treated empirically over the telephone, so it is difficult to say since no exam was performed at that time by a medical professional.  I agree with her being seen by [facility].  She will continue on the antibiotic.  She will follow up with us after her [facility] Th[facility] consultation.     Laverne Montague MD, CAQ, FACSM, CCD  [facility]  Sports Medicine and Bone Health  Team Physician;  Athletics      Laverne Montague MD

## 2017-03-21 NOTE — LETTER
Date:April 5, 2017      Patient was self referred, no letter generated. Do not send.        [facility] Health Information

## 2017-03-27 ENCOUNTER — OFFICE VISIT (OUTPATIENT)
Dept: FAMILY MEDICINE | Facility: CLINIC | Age: 22
End: 2017-03-27

## 2017-03-27 VITALS
SYSTOLIC BLOOD PRESSURE: 118 MMHG | BODY MASS INDEX: 26.48 KG/M2 | WEIGHT: 185 LBS | HEART RATE: 76 BPM | DIASTOLIC BLOOD PRESSURE: 93 MMHG | HEIGHT: 70 IN

## 2017-03-27 DIAGNOSIS — M79.672 FOOT PAIN, LEFT: Primary | ICD-10-CM

## 2017-03-27 NOTE — PROGRESS NOTES
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  She states that 2 days ago on Saturday she was standing on a chair putting some things away in her cupboards and jumped down and slipped on some water on the floor and rolled her ankle.  She states that it hurt significantly.  She thought maybe she sprained it.  She woke up the next morning and it was very bruised and swollen and she had a lot of pain over the lateral foot.  She has had multiple ankle sprains in the past and this seems different.  She did get an x-ray prior to me seeing her today.  There is no numbness or tingling.  She thinks her ankle is essentially fine but her foot is the problem.      OBJECTIVE:  Pleasant, limping.  Vital signs as listed.  Her left ankle shows full range of motion and good strength, some pain with inversion as well as testing of inversion strength but good strength throughout.  The patient has some mild tenderness over the ATFL ligament.  She has a negative Carrero test, nontender over the Achilles, no posterior pain.  In the foot, she has significant bruising and swelling that is localized more on the lateral dorsal aspect of the foot.  She is very tender on the mid to distal fifth metatarsal.  She is otherwise nontender.  She has good movement of her toes.  She has normal pulses and good sensation.  She is otherwise nontender about her foot.      IMAGING:  X-rays of her, 3 views, were obtained, AP, lateral and oblique.  These show evidence of a midshaft and distal comminuted and mildly displaced fracture.      ASSESSMENT:     1.  Fifth midshaft and distal metatarsal comminuted and mildly displaced fracture in a [facility] female golfer.   2.  Mild ankle inversion sprain.      PLAN:  At this time, Emili will be placed into a walking boot.  She will ice and elevate.  I have notified Dr. Lynch, foot and ankle surgeon, of her situation and asked him  to review her x-rays and see if he would recommend a surgical intervention for these findings.  We will await his recommendations.  Ariana Ledesma, ATC for [facility] women's golf, was present for the entire appointment.     Laverne Montague MD, CAQ, FACSM, CCD  [facility]  Sports Medicine and Bone Health  Team Physician;  Athletics

## 2017-03-27 NOTE — LETTER
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  She states that 2 days ago on Saturday she was standing on a chair putting some things away in her cupboards and jumped down and slipped on some water on the floor and rolled her ankle.  She states that it hurt significantly.  She thought maybe she sprained it.  She woke up the next morning and it was very bruised and swollen and she had a lot of pain over the lateral foot.  She has had multiple ankle sprains in the past and this seems different.  She did get an x-ray prior to me seeing her today.  There is no numbness or tingling.  She thinks her ankle is essentially fine but her foot is the problem.      OBJECTIVE:  Pleasant, limping.  Vital signs as listed.  Her left ankle shows full range of motion and good strength, some pain with inversion as well as testing of inversion strength but good strength throughout.  The patient has some mild tenderness over the ATFL ligament.  She has a negative Carrero test, nontender over the Achilles, no posterior pain.  In the foot, she has significant bruising and swelling that is localized more on the lateral dorsal aspect of the foot.  She is very tender on the mid to distal fifth metatarsal.  She is otherwise nontender.  She has good movement of her toes.  She has normal pulses and good sensation.  She is otherwise nontender about her foot.      IMAGING:  X-rays of her, 3 views, were obtained, AP, lateral and oblique.  These show evidence of a midshaft and distal comminuted and mildly displaced fracture.      ASSESSMENT:     1.  Fifth midshaft and distal metatarsal comminuted and mildly displaced fracture in a [facility] female golfer.   2.  Mild ankle inversion sprain.      PLAN:  At this time, Emili will be placed into a walking boot.  She will  ice and elevate.  I have notified Dr. Lynch, foot and ankle surgeon, of her situation and asked him to review her x-rays and see if he would recommend a surgical intervention for these findings.  We will await his recommendations.  Ariana Ledesma, ATC for [facility] women's golf, was present for the entire appointment.     Laverne Montague MD, CAQ, FACSM, CCD  [facility]  Sports Medicine and Bone Health  Team Physician;  Athletics      Laverne Montague MD

## 2017-03-27 NOTE — LETTER
Date:March 31, 2017      Patient was self referred, no letter generated. Do not send.        [facility] Health Information

## 2017-03-27 NOTE — MR AVS SNAPSHOT
After Visit Summary   3/27/2017    Ruchi Tobias    MRN: [id number]           Patient Information     Date Of Birth          1995        Visit Information        Provider Department      3/27/2017 10:15 AM Laverne Montague MD [facility]        Today's Diagnoses     Foot pain, left    -  1       Follow-ups after your visit        Your next 10 appointments already scheduled     Apr 04, 2017  4:15 PM CDT   Return Visit with Shakeel Lynch MD   [facility] ([facility])    [address] [facility] [address]   [phone]              Who to contact     Please call your clinic at [phone] to:    Ask questions about your health    Make or cancel appointments    Discuss your medicines    Learn about your test results    Speak to your doctor   If you have compliments or concerns about an experience at your clinic, or if you wish to file a complaint, please contact [facility] Patient Relations at [phone] or email us at [email or web address]         Additional Information About Your Visit        MyChart Information     [employer] gives you secure access to your electronic health record. If you see a primary care provider, you can also send messages to your care team and make appointments. If you have questions, please call your primary care clinic.  If you do not have a primary care provider, please call [phone] and they will assist you.      RIGID is an electronic gateway that provides easy, online access to your medical records. With RIGID, you can request a clinic appointment, read your test results, renew a prescription or communicate with your care team.     To access your existing account, please contact your [facility] Clinic or call [phone] for assistance.        Care EveryWhere ID     This is your Care EveryWhere ID. This could be  "used by other organizations to access your [place] medical records  [id number]        Your Vitals Were     Pulse Height BMI (Body Mass Index)             76 1.778 m (5' 10\") 26.54 kg/m2          Blood Pressure from Last 3 Encounters:   03/27/17 (!) 118/93   03/21/17 118/72   03/02/17 129/76    Weight from Last 3 Encounters:   03/27/17 83.9 kg (185 lb)   03/21/17 83.8 kg (184 lb 12.8 oz)   03/02/17 81.8 kg (180 lb 6.4 oz)              Today, you had the following     No orders found for display       Primary Care Provider    Md Other Clinic                Thank you!     Thank you for choosing [facility]  for your care. Our goal is always to provide you with excellent care. Hearing back from our patients is one way we can continue to improve our services. Please take a few minutes to complete the written survey that you may receive in the mail after your visit with us. Thank you!             Your Updated Medication List - Protect others around you: Learn how to safely use, store and throw away your medicines at www.disposemymeds.org.          This list is accurate as of: 3/27/17 11:59 PM.  Always use your most recent med list.                   Brand Name Dispense Instructions for use    AMOXICILLIN PO          * FLUoxetine 10 MG tablet    PROzac    90 tablet    Take 1 tablet (10 mg) by mouth daily       * FLUoxetine 20 MG capsule    PROzac    90 capsule    Take 1 capsule (20 mg) by mouth daily       * FLUoxetine 40 MG capsule    PROzac    93 capsule    Take 1 capsule (40 mg) by mouth daily       levonorgestrel 20 MCG/24HR IUD    MIRENA     1 each by Intrauterine route once       * Notice:  This list has 3 medication(s) that are the same as other medications prescribed for you. Read the directions carefully, and ask your doctor or other care provider to review them with you.      "

## 2017-03-28 NOTE — PROGRESS NOTES
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  She recently was diagnosed with a peritonsillar abscess and this was drained near the end of February.  It seemed to be doing much better.  She was treated with an antibiotic.  She was out of town this week at a golf tournament over the weekend and her throat began to bother her again.  Her , Ariana, was contacted and she contacted me and I recommended that she be seen in an urgent care to have her throat evaluated.  She had bilateral symptoms.  They did not do this, however.  She chose to call her primary care physician who prescribed an antibiotic over the telephone.  She has been taking this antibiotic and overall is feeling improved.  No fevers or chills.  She is having just a small amount of difficulty swallowing but is greatly improved.  She was put on amoxicillin.  No other symptoms.  She now has an appointment scheduled at [facility] in [place] tomorrow.      OBJECTIVE:  Pleasant, in no apparent distress.  Vital signs as listed.  HEENT is negative except for the following:  Her throat shows erythema bilaterally that is mild.  There is some mild tonsillar exudate bilaterally.  No significant swelling on the palate.  Her neck, she has cervical lymphadenopathy bilaterally that is mildly tender to palpation and is about 0.5 cm.  No posterior cervical lymphadenopathy.  Heart and lungs are clear.      ASSESSMENT:  Peritonsillar abscess x1 recently, now with recurrent pharyngitis that may be strep throat.      PLAN:  I have explained to her that bilateral peritonsillar abscess would be unusual.  She was treated empirically over the telephone, so it is difficult to say since no exam was performed at that time by a medical professional.  I agree with her being seen by [facility] Throat.  She will continue on the antibiotic.  She will follow up with us after her Ear, Nose and  Throat consultation.     Laverne Montague MD, CAQ, FACSM, CCD  [facility]  Sports Medicine and Bone Health  Team Physician;  Athletics

## 2017-04-04 ENCOUNTER — OFFICE VISIT (OUTPATIENT)
Dept: ORTHOPEDICS | Facility: CLINIC | Age: 22
End: 2017-04-04

## 2017-04-04 DIAGNOSIS — M25.572 PAIN IN JOINT, ANKLE AND FOOT, LEFT: Primary | ICD-10-CM

## 2017-04-04 NOTE — LETTER
Date:April 12, 2017      Patient was self referred, no letter generated. Do not send.        [facility] Health Information

## 2017-04-04 NOTE — MR AVS SNAPSHOT
After Visit Summary   4/4/2017    Ruchi Tobias    MRN: [id number]           Patient Information     Date Of Birth          1995        Visit Information        Provider Department      4/4/2017 5:00 PM Shakeel Lynch MD [facility]        Today's Diagnoses     Pain in joint, ankle and foot, left    -  1       Follow-ups after your visit        Your next 10 appointments already scheduled     Apr 11, 2017  3:00 PM CDT   Return Visit with Laverne Montague MD   [facility]    [address] [facility] [address]   [phone]              Who to contact     Please call your clinic at [phone] to:    Ask questions about your health    Make or cancel appointments    Discuss your medicines    Learn about your test results    Speak to your doctor   If you have compliments or concerns about an experience at your clinic, or if you wish to file a complaint, please contact [facility] Patient Relations at [phone] or email us at [email or web address]         Additional Information About Your Visit        MyChart Information     [facility] [employer] gives you secure access to your electronic health record. If you see a primary care provider, you can also send messages to your care team and make appointments. If you have questions, please call your primary care clinic.  If you do not have a primary care provider, please call [phone] and they will assist you.      [facility] is an electronic gateway that provides easy, online access to your medical records. With [employer], you can request a clinic appointment, read your test results, renew a prescription or communicate with your care team.     To access your existing account, please contact your [facility] or call [phone] for assistance.        Care EveryWhere ID     This is your Care EveryWhere  ID. This could be used by other organizations to access your [place] medical records  [id number]         Blood Pressure from Last 3 Encounters:   03/27/17 (!) 118/93   03/21/17 118/72   03/02/17 129/76    Weight from Last 3 Encounters:   03/27/17 83.9 kg (185 lb)   03/21/17 83.8 kg (184 lb 12.8 oz)   03/02/17 81.8 kg (180 lb 6.4 oz)              Today, you had the following     No orders found for display       Primary Care Provider    Md Other Clinic                Thank you!     Thank you for choosing [facility]  for your care. Our goal is always to provide you with excellent care. Hearing back from our patients is one way we can continue to improve our services. Please take a few minutes to complete the written survey that you may receive in the mail after your visit with us. Thank you!             Your Updated Medication List - Protect others around you: Learn how to safely use, store and throw away your medicines at www.disposemymeds.org.          This list is accurate as of: 4/4/17 11:59 PM.  Always use your most recent med list.                   Brand Name Dispense Instructions for use    AMOXICILLIN PO          * FLUoxetine 10 MG tablet    PROzac    90 tablet    Take 1 tablet (10 mg) by mouth daily       * FLUoxetine 20 MG capsule    PROzac    90 capsule    Take 1 capsule (20 mg) by mouth daily       * FLUoxetine 40 MG capsule    PROzac    93 capsule    Take 1 capsule (40 mg) by mouth daily       levonorgestrel 20 MCG/24HR IUD    MIRENA     1 each by Intrauterine route once       * Notice:  This list has 3 medication(s) that are the same as other medications prescribed for you. Read the directions carefully, and ask your doctor or other care provider to review them with you.

## 2017-04-04 NOTE — PROGRESS NOTES
CHIEF COMPLAINT:  Left foot fifth metatarsal fracture sustained on 03/25/2017.      HISTORY OF PRESENT ILLNESS:  Ms. Tobias presents today for further followup in the accompaniment of her mother.  The patient reports to have sustained an injury while stepping down from putting some objects away in a cabinet.  She sustained an acute onset of pain.  Eventually, she was diagnosed with a fifth metatarsal shaft oblique fracture.  The patient has been placed in a short CAM Walker and she has been weightbearing as tolerated.  Reports to be doing quite well.      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      On today's visit, past medical history, past surgical history, current medications and drug allergies were reviewed.      PHYSICAL EXAMINATION:  On today's visit, she presents as a pleasant female in no apparent distress with a height of 5 feet 10 and a weight of 185 pounds.  Denies to have any constitutional symptoms.      On today's visit, she presents with full range of motion of the ankle, hindfoot and midfoot joints.  CMS is intact.  Skin is intact.  There is some mild discomfort with palpation of the fifth metatarsal.  There is some ecchymosis along the dorsal aspect of the foot, especially along the inner metatarsal spaces.      RADIOGRAPHIC EVALUATION:  Three views of the foot were reviewed today from our previous visit which were significant for showing a somewhat displaced but still with excellent contact across the fracture fragments for an oblique fracture of the fifth metatarsal shaft.      ASSESSMENT:  Left foot fifth metatarsal fracture.      PLAN:  I discussed with the patient and her mother, who was present through the whole interview as well as the , that at this point we can proceed with weightbearing as tolerated with the use of the short CAM Walker.  I anticipate that she will require the CAM Walker for the next 6-8 weeks.  In 6 weeks  from the injury, another 3 views of the left foot will be obtained and based on those findings, further recommendations will be given to the patient.      The patient is cleared to proceed with activity without restrictions as long as she does not have any pain.  All questions were answered.      TT 30 minutes, CT 20 minutes.

## 2017-04-04 NOTE — LETTER
4/4/2017      RE: Ruchi Tobias  [address]  [address]  [place] [facility] [address]       CHIEF COMPLAINT:  Left foot fifth metatarsal fracture sustained on 03/25/2017.      HISTORY OF PRESENT ILLNESS:  Ms. Tobias presents today for further followup in the accompaniment of her mother.  The patient reports to have sustained an injury while stepping down from putting some objects away in a cabinet.  She sustained an acute onset of pain.  Eventually, she was diagnosed with a fifth metatarsal shaft oblique fracture.  The patient has been placed in a short CAM Walker and she has been weightbearing as tolerated.  Reports to be doing quite well.      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      On today's visit, past medical history, past surgical history, current medications and drug allergies were reviewed.      PHYSICAL EXAMINATION:  On today's visit, she presents as a pleasant female in no apparent distress with a height of 5 feet 10 and a weight of 185 pounds.  Denies to have any constitutional symptoms.      On today's visit, she presents with full range of motion of the ankle, hindfoot and midfoot joints.  CMS is intact.  Skin is intact.  There is some mild discomfort with palpation of the fifth metatarsal.  There is some ecchymosis along the dorsal aspect of the foot, especially along the inner metatarsal spaces.      RADIOGRAPHIC EVALUATION:  Three views of the foot were reviewed today from our previous visit which were significant for showing a somewhat displaced but still with excellent contact across the fracture fragments for an oblique fracture of the fifth metatarsal shaft.      ASSESSMENT:  Left foot fifth metatarsal fracture.      PLAN:  I discussed with the patient and her mother, who was present through the whole interview as well as the , that at this point we can proceed with weightbearing as tolerated with the use of the short  CAM Walker.  I anticipate that she will require the CAM Walker for the next 6-8 weeks.  In 6 weeks from the injury, another 3 views of the left foot will be obtained and based on those findings, further recommendations will be given to the patient.      The patient is cleared to proceed with activity without restrictions as long as she does not have any pain.  All questions were answered.      TT 30 minutes, CT 20 minutes.         Shakeel Lynch MD

## 2017-04-11 ENCOUNTER — OFFICE VISIT (OUTPATIENT)
Dept: FAMILY MEDICINE | Facility: CLINIC | Age: 22
End: 2017-04-11

## 2017-04-11 VITALS
DIASTOLIC BLOOD PRESSURE: 76 MMHG | WEIGHT: 184.8 LBS | TEMPERATURE: 99.3 F | BODY MASS INDEX: 25.87 KG/M2 | HEART RATE: 98 BPM | SYSTOLIC BLOOD PRESSURE: 115 MMHG | HEIGHT: 71 IN

## 2017-04-11 DIAGNOSIS — R07.0 THROAT PAIN: Primary | ICD-10-CM

## 2017-04-11 LAB
DEPRECATED S PYO AG THROAT QL EIA: NORMAL
MICRO REPORT STATUS: NORMAL
SPECIMEN SOURCE: NORMAL

## 2017-04-11 RX ORDER — AMOXICILLIN 875 MG
875 TABLET ORAL 2 TIMES DAILY
Qty: 20 TABLET | Refills: 0 | Status: SHIPPED | OUTPATIENT
Start: 2017-04-11

## 2017-04-11 ASSESSMENT — PAIN SCALES - GENERAL: PAINLEVEL: MODERATE PAIN (4)

## 2017-04-11 NOTE — MR AVS SNAPSHOT
"              After Visit Summary   4/11/2017    Ruchi Tobias    MRN: [id number]           Patient Information     Date Of Birth          1995        Visit Information        Provider Department      4/11/2017 3:00 PM Laverne Montague MD [facility]        Today's Diagnoses     Throat pain    -  1       Follow-ups after your visit        Who to contact     Please call your clinic at [phone] to:    Ask questions about your health    Make or cancel appointments    Discuss your medicines    Learn about your test results    Speak to your doctor   If you have compliments or concerns about an experience at your clinic, or if you wish to file a complaint, please contact [facility] Patient Relations at [phone] or email us at [email or web address]         Additional Information About Your Visit        MyChart Information     [employer] gives you secure access to your electronic health record. If you see a primary care provider, you can also send messages to your care team and make appointments. If you have questions, please call your primary care clinic.  If you do not have a primary care provider, please call [phone] and they will assist you.      [employer] is an electronic gateway that provides easy, online access to your medical records. With [employer], you can request a clinic appointment, read your test results, renew a prescription or communicate with your care team.     To access your existing account, please contact your [facility] or call [phone] for assistance.        Care EveryWhere ID     This is your Care EveryWhere ID. This could be used by other organizations to access your [place] medical records  [id number]        Your Vitals Were     Pulse Temperature Height BMI (Body Mass Index)          98 99.3  F (37.4  C) 5' 11\" (1.803 m) 25.77 kg/m2         Blood Pressure from Last 3 " Encounters:   04/11/17 115/76   03/27/17 (!) 118/93   03/21/17 118/72    Weight from Last 3 Encounters:   04/11/17 184 lb 12.8 oz (83.8 kg)   03/27/17 185 lb (83.9 kg)   03/21/17 184 lb 12.8 oz (83.8 kg)              We Performed the Following     Beta strep group A culture     Rapid strep screen          Today's Medication Changes          These changes are accurate as of: 4/11/17 11:59 PM.  If you have any questions, ask your nurse or doctor.               These medicines have changed or have updated prescriptions.        Dose/Directions    * AMOXICILLIN PO   This may have changed:  Another medication with the same name was added. Make sure you understand how and when to take each.   Changed by:  Laverne Montague MD        Refills:  0       * amoxicillin 875 MG tablet   Commonly known as:  AMOXIL   This may have changed:  You were already taking a medication with the same name, and this prescription was added. Make sure you understand how and when to take each.   Used for:  Throat pain   Changed by:  Laverne Montague MD        Dose:  875 mg   Take 1 tablet (875 mg) by mouth 2 times daily   Quantity:  20 tablet   Refills:  0       * Notice:  This list has 2 medication(s) that are the same as other medications prescribed for you. Read the directions carefully, and ask your doctor or other care provider to review them with you.         Where to get your medicines      These medications were sent to [place] [address] [place] [address]  [address] [place] [address]     Phone:  [phone]     amoxicillin 875 MG tablet                Primary Care Provider    Md Other Clinic                Thank you!     Thank you for choosing [facility]  for your care. Our goal is always to provide you with excellent care. Hearing back from our patients is one way we can continue to improve our services. Please take a few minutes to complete the written  survey that you may receive in the mail after your visit with us. Thank you!             Your Updated Medication List - Protect others around you: Learn how to safely use, store and throw away your medicines at www.disposemymeds.org.          This list is accurate as of: 4/11/17 11:59 PM.  Always use your most recent med list.                   Brand Name Dispense Instructions for use    * AMOXICILLIN PO          * amoxicillin 875 MG tablet    AMOXIL    20 tablet    Take 1 tablet (875 mg) by mouth 2 times daily       * FLUoxetine 10 MG tablet    PROzac    90 tablet    Take 1 tablet (10 mg) by mouth daily       * FLUoxetine 20 MG capsule    PROzac    90 capsule    Take 1 capsule (20 mg) by mouth daily       * FLUoxetine 40 MG capsule    PROzac    93 capsule    Take 1 capsule (40 mg) by mouth daily       levonorgestrel 20 MCG/24HR IUD    MIRENA     1 each by Intrauterine route once       * Notice:  This list has 5 medication(s) that are the same as other medications prescribed for you. Read the directions carefully, and ask your doctor or other care provider to review them with you.

## 2017-04-11 NOTE — LETTER
4/11/2017      RE: Ruchi Tobias  [address]  [address]  [place] [address]       Attending Note:   I have personally examined this patient and have reviewed the clinical presentation and progress note with the fellow. I agree with the treatment plan as outlined. The plan was formulated with the fellow on the day of the patient's visit.   Laverne Montague MD, CAQ, CCD  [facility]  Sports Medicine and Bone Health    HPI      cc: sore throat  - last night started to have a throbbing HA  - subjective fever / chills  - did have some sore throat  - has a hx of recurrent strep infection with abscess formation  - has seen two ENT physicians with a recommendation to consider tonsillectomy if this continues to be a problem    10 point ROS was obtained and negative except as noted above    Medical History     History reviewed. No pertinent surgical history.    History reviewed. No pertinent past medical history.    No Known Allergies    Social History     Social History     Marital status: Single     Spouse name: N/A     Number of children: N/A     Years of education: N/A     Occupational History     Not on file.     Social History Main Topics     Smoking status: Never Smoker     Smokeless tobacco: Never Used     Alcohol use No     Drug use: No     Sexual activity: Not Currently     Partners: Female     Birth control/ protection: IUD     Other Topics Concern     Not on file     Social History Narrative       Family History   Problem Relation Age of Onset     Coronary Artery Disease Maternal Grandfather      Depression Sister        Current Outpatient Prescriptions   Medication     AMOXICILLIN PO     FLUoxetine (PROZAC) 40 MG capsule     FLUoxetine (PROZAC) 20 MG capsule     FLUoxetine (PROZAC) 10 MG tablet     levonorgestrel (MIRENA) 20 MCG/24HR IUD     No current facility-administered medications for this visit.            Objective Findings     Vitals:    04/11/17 1532   BP: 115/76   Pulse:  "98   Temp: 99.3  F (37.4  C)   Weight: 184 lb 12.8 oz (83.8 kg)   Height: 5' 11\" (1.803 m)         Physical Exam:  Gen: A&O in NAD  HEENT: EOMI, w/o conjunctivitis, TM's pearly grey b/l, canals without erythema or exudate, posterior pharynx w erythema, no exudate, some moderate tonsillar swelling noted b/l, x1 anterior cervical lymph node  CV: RRR wo m/r/g  Pulm:  CTA b/l wo w/r/r, without respiratory distress  Derm: no rashes or lesions  Psych: normal affect        Assessment / Plan     1) Sore Throat  - given PMX, suspicious for strep pharyngitis  - start on amoxicillin 875mg BID [id number]  - rapid strep sent  - if positive will need to further consider tonsillectomy    patient seen and case dw Dr. Clari Gray, D.O.  Sports Medicine Fellow      Laverne Montague MD    "

## 2017-04-11 NOTE — PROGRESS NOTES
"HPI      cc: sore throat  - last night started to have a throbbing HA  - subjective fever / chills  - did have some sore throat  - has a hx of recurrent strep infection with abscess formation  - has seen two ENT physicians with a recommendation to consider tonsillectomy if this continues to be a problem    10 point ROS was obtained and negative except as noted above    Medical History     History reviewed. No pertinent surgical history.    History reviewed. No pertinent past medical history.    No Known Allergies    Social History     Social History     Marital status: Single     Spouse name: N/A     Number of children: N/A     Years of education: N/A     Occupational History     Not on file.     Social History Main Topics     Smoking status: Never Smoker     Smokeless tobacco: Never Used     Alcohol use No     Drug use: No     Sexual activity: Not Currently     Partners: Female     Birth control/ protection: IUD     Other Topics Concern     Not on file     Social History Narrative       Family History   Problem Relation Age of Onset     Coronary Artery Disease Maternal Grandfather      Depression Sister        Current Outpatient Prescriptions   Medication     AMOXICILLIN PO     FLUoxetine (PROZAC) 40 MG capsule     FLUoxetine (PROZAC) 20 MG capsule     FLUoxetine (PROZAC) 10 MG tablet     levonorgestrel (MIRENA) 20 MCG/24HR IUD     No current facility-administered medications for this visit.            Objective Findings     Vitals:    04/11/17 1532   BP: 115/76   Pulse: 98   Temp: 99.3  F (37.4  C)   Weight: 184 lb 12.8 oz (83.8 kg)   Height: 5' 11\" (1.803 m)         Physical Exam:  Gen: A&O in NAD  HEENT: EOMI, w/o conjunctivitis, TM's pearly grey b/l, canals without erythema or exudate, posterior pharynx w erythema, no exudate, some moderate tonsillar swelling noted b/l, x1 anterior cervical lymph node  CV: RRR wo m/r/g  Pulm:  CTA b/l wo w/r/r, without respiratory distress  Derm: no rashes or lesions  Psych: " normal affect        Assessment / Plan     1) Sore Throat  - given PMX, suspicious for strep pharyngitis  - start on amoxicillin 875mg BID [id number]  - rapid strep sent  - if positive will need to further consider tonsillectomy    patient seen and case dw Dr. Clari Gray D.O.  Sports Medicine Fellow

## 2017-04-11 NOTE — LETTER
Date:April 14, 2017      Patient was self referred, no letter generated. Do not send.        [facility] Health Information

## 2017-04-13 LAB
BACTERIA SPEC CULT: ABNORMAL
MICRO REPORT STATUS: ABNORMAL
SPECIMEN SOURCE: ABNORMAL

## 2017-04-13 NOTE — PROGRESS NOTES
Attending Note:   I have personally examined this patient and have reviewed the clinical presentation and progress note with the fellow. I agree with the treatment plan as outlined. The plan was formulated with the fellow on the day of the patient's visit.   Laverne Montague MD, CAQ, CCD  [facility]  Sports Medicine and Bone Health

## 2017-05-09 ENCOUNTER — OFFICE VISIT (OUTPATIENT)
Dept: ORTHOPEDICS | Facility: CLINIC | Age: 22
End: 2017-05-09

## 2017-05-09 DIAGNOSIS — M25.572 PAIN IN JOINT, ANKLE AND FOOT, LEFT: Primary | ICD-10-CM

## 2017-05-09 NOTE — MR AVS SNAPSHOT
After Visit Summary   5/9/2017    Ruchi Tobias    MRN: [id number]           Patient Information     Date Of Birth          1995        Visit Information        Provider Department      5/9/2017 2:45 PM Shakeel Lynch MD [facility]        Today's Diagnoses     Pain in joint, ankle and foot, left    -  1       Follow-ups after your visit        Who to contact     Please call your clinic at [phone] to:    Ask questions about your health    Make or cancel appointments    Discuss your medicines    Learn about your test results    Speak to your doctor   If you have compliments or concerns about an experience at your clinic, or if you wish to file a complaint, please contact [facility] Patient Relations at [phone] or email us at [email or web address]         Additional Information About Your Visit        MyChart Information     [employer] gives you secure access to your electronic health record. If you see a primary care provider, you can also send messages to your care team and make appointments. If you have questions, please call your primary care clinic.  If you do not have a primary care provider, please call [phone] and they will assist you.      [employer] is an electronic gateway that provides easy, online access to your medical records. With [employer], you can request a clinic appointment, read your test results, renew a prescription or communicate with your care team.     To access your existing account, please contact your [facility] or call [phone] for assistance.        Care EveryWhere ID     This is your Care EveryWhere ID. This could be used by other organizations to access your [place] medical records  [id number]         Blood Pressure from Last 3 Encounters:   04/11/17 115/76   03/27/17 (!) 118/93   03/21/17 118/72    Weight from Last 3 Encounters:   04/11/17 83.8 kg  (184 lb 12.8 oz)   03/27/17 83.9 kg (185 lb)   03/21/17 83.8 kg (184 lb 12.8 oz)              Today, you had the following     No orders found for display       Primary Care Provider    Md Other Clinic                Thank you!     Thank you for choosing [facility]  for your care. Our goal is always to provide you with excellent care. Hearing back from our patients is one way we can continue to improve our services. Please take a few minutes to complete the written survey that you may receive in the mail after your visit with us. Thank you!             Your Updated Medication List - Protect others around you: Learn how to safely use, store and throw away your medicines at www.disposemymeds.org.          This list is accurate as of: 5/9/17 11:59 PM.  Always use your most recent med list.                   Brand Name Dispense Instructions for use    * AMOXICILLIN PO      Reported on 5/9/2017       * amoxicillin 875 MG tablet    AMOXIL    20 tablet    Take 1 tablet (875 mg) by mouth 2 times daily       * FLUoxetine 10 MG tablet    PROzac    90 tablet    Take 1 tablet (10 mg) by mouth daily       * FLUoxetine 20 MG capsule    PROzac    90 capsule    Take 1 capsule (20 mg) by mouth daily       * FLUoxetine 40 MG capsule    PROzac    93 capsule    Take 1 capsule (40 mg) by mouth daily       levonorgestrel 20 MCG/24HR IUD    MIRENA     1 each by Intrauterine route once       * Notice:  This list has 5 medication(s) that are the same as other medications prescribed for you. Read the directions carefully, and ask your doctor or other care provider to review them with you.

## 2017-05-09 NOTE — PROGRESS NOTES
CHIEF COMPLAINT:  Status post left fifth metatarsal fracture sustained on 03/25/2017.      HISTORY OF PRESENT ILLNESS:  Ms. Tobias presents today for further followup.  Denies to have any problems or complaints.  Reports to have no pain.  Reports to have discontinued the use of the CAM Walker over the past 3 days.      PHYSICAL EXAMINATION:  On today's visit, she presents with full range of motion of the left ankle, hindfoot and midfoot joints.  CMS is intact.  Skin is intact.  There is no pain with palpation of the fracture site.      RADIOGRAPHIC EVALUATION:  Three views of the left foot were reviewed today from 24 hours ago which was significant for showing a large amount of callus formation across the fracture site.  Alignment is unchanged.      ASSESSMENT:  Status post left fifth metatarsal fracture.      PLAN:  I discussed with the patient and the  that she is making excellent progress.  She will proceed with activities as tolerated.  The patient has no activity restrictions.  The patient will follow up on a p.r.n. basis.      TT 15 minutes, CT 10 minutes.

## 2017-05-09 NOTE — LETTER
5/9/2017      RE: Ruchi Tobias  [address]  [address]  [place] [facility] [address]       CHIEF COMPLAINT:  Status post left fifth metatarsal fracture sustained on 03/25/2017.      HISTORY OF PRESENT ILLNESS:  Ms. Tobias presents today for further followup.  Denies to have any problems or complaints.  Reports to have no pain.  Reports to have discontinued the use of the CAM Walker over the past 3 days.      PHYSICAL EXAMINATION:  On today's visit, she presents with full range of motion of the left ankle, hindfoot and midfoot joints.  CMS is intact.  Skin is intact.  There is no pain with palpation of the fracture site.      RADIOGRAPHIC EVALUATION:  Three views of the left foot were reviewed today from 24 hours ago which was significant for showing a large amount of callus formation across the fracture site.  Alignment is unchanged.      ASSESSMENT:  Status post left fifth metatarsal fracture.      PLAN:  I discussed with the patient and the  that she is making excellent progress.  She will proceed with activities as tolerated.  The patient has no activity restrictions.  The patient will follow up on a p.r.n. basis.      TT 15 minutes, CT 10 minutes.         Shakeel Lynch MD

## 2017-05-09 NOTE — LETTER
Date:May 16, 2017      Patient was self referred, no letter generated. Do not send.        [facility] Health Information

## 2017-05-23 ENCOUNTER — HOSPITAL ENCOUNTER (OUTPATIENT)
Facility: CLINIC | Age: 22
End: 2017-05-23
Attending: OTOLARYNGOLOGY | Admitting: OTOLARYNGOLOGY
Payer: COMMERCIAL

## 2017-06-01 NOTE — OR NURSING
Spoke with patients mother who stated pt. Was cancelling her surgery. Mom will notify surgeons office

## 2017-09-21 ENCOUNTER — OFFICE VISIT (OUTPATIENT)
Dept: FAMILY MEDICINE | Facility: CLINIC | Age: 22
End: 2017-09-21

## 2017-09-21 VITALS
SYSTOLIC BLOOD PRESSURE: 120 MMHG | HEART RATE: 94 BPM | DIASTOLIC BLOOD PRESSURE: 82 MMHG | HEIGHT: 71 IN | BODY MASS INDEX: 25.2 KG/M2 | WEIGHT: 180 LBS

## 2017-09-21 DIAGNOSIS — M79.672 LEFT FOOT PAIN: Primary | ICD-10-CM

## 2017-09-21 ASSESSMENT — ENCOUNTER SYMPTOMS
CARDIOVASCULAR NEGATIVE: 1
RESPIRATORY NEGATIVE: 1
FALLS: 0
PSYCHIATRIC NEGATIVE: 1
NEUROLOGICAL NEGATIVE: 1
CONSTITUTIONAL NEGATIVE: 1
MYALGIAS: 1
NECK PAIN: 0

## 2017-09-21 NOTE — LETTER
9/21/2017      RE: Ruchi Tobias  [address]  [address]  [place] [address]       HPI  In with L foot pain.  Had 5th MT fracture that is healed.  Now pain over her 2nd/3rd MT.  No trauma that she remembers.  Pos swelling and bone pain.  In boot now.  Better with that.  No other concerns.    Review of Systems   Constitutional: Negative.    HENT: Negative.    Respiratory: Negative.    Cardiovascular: Negative.    Musculoskeletal: Positive for joint pain and myalgias. Negative for falls and neck pain.   Skin: Negative.    Neurological: Negative.    Psychiatric/Behavioral: Negative.      As above    Physical Exam   Constitutional: She is oriented to person, place, and time and well-developed, well-nourished, and in no distress. No distress.   Musculoskeletal: Normal range of motion. She exhibits tenderness. She exhibits no edema or deformity.   Neurological: She is alert and oriented to person, place, and time.   Skin: Skin is warm and dry. No rash noted. She is not diaphoretic. No erythema.   Psychiatric: Memory, affect and judgment normal.     Pain to palpation over the 2nd MT head/neck    L foot pain  -greatest over 2nd MT head.  It is bony in nature  -MRI to rule out stress injury or fracture    Josse York MD

## 2017-09-21 NOTE — PROGRESS NOTES
HPI  In with L foot pain.  Had 5th MT fracture that is healed.  Now pain over her 2nd/3rd MT.  No trauma that she remembers.  Pos swelling and bone pain.  In boot now.  Better with that.  No other concerns.    Review of Systems   Constitutional: Negative.    HENT: Negative.    Respiratory: Negative.    Cardiovascular: Negative.    Musculoskeletal: Positive for joint pain and myalgias. Negative for falls and neck pain.   Skin: Negative.    Neurological: Negative.    Psychiatric/Behavioral: Negative.      As above    Physical Exam   Constitutional: She is oriented to person, place, and time and well-developed, well-nourished, and in no distress. No distress.   Musculoskeletal: Normal range of motion. She exhibits tenderness. She exhibits no edema or deformity.   Neurological: She is alert and oriented to person, place, and time.   Skin: Skin is warm and dry. No rash noted. She is not diaphoretic. No erythema.   Psychiatric: Memory, affect and judgment normal.     Pain to palpation over the 2nd MT head/neck    L foot pain  -greatest over 2nd MT head.  It is bony in nature  -MRI to rule out stress injury or fracture

## 2017-09-21 NOTE — LETTER
Date:September 22, 2017      Patient was self referred, no letter generated. Do not send.        [facility] Health Information

## 2017-09-21 NOTE — MR AVS SNAPSHOT
After Visit Summary   9/21/2017    Ruchi Tobias    MRN: [id number]           Patient Information     Date Of Birth          1995        Visit Information        Provider Department      9/21/2017 11:45 AM Josse York MD [facility]        Today's Diagnoses     Left foot pain    -  1       Follow-ups after your visit        Future tests that were ordered for you today     Open Future Orders        Priority Expected Expires Ordered    MRI Lower extremity joint w & w/o contrast lt Routine  9/28/2017 9/21/2017            Who to contact     Please call your clinic at [phone] to:    Ask questions about your health    Make or cancel appointments    Discuss your medicines    Learn about your test results    Speak to your doctor   If you have compliments or concerns about an experience at your clinic, or if you wish to file a complaint, please contact [facility] Patient Relations at [phone] or email us at [email or web address]         Additional Information About Your Visit        MyChart Information     [facility] gives you secure access to your electronic health record. If you see a primary care provider, you can also send messages to your care team and make appointments. If you have questions, please call your primary care clinic.  If you do not have a primary care provider, please call [phone] and they will assist you.      Netvibes is an electronic gateway that provides easy, online access to your medical records. With Netvibes, you can request a clinic appointment, read your test results, renew a prescription or communicate with your care team.     To access your existing account, please contact your [facility] or call [phone] for assistance.        Care EveryWhere ID     This is your Care EveryWhere ID. This could be used by other organizations to access your [facility]  "records  [id number]        Your Vitals Were     Pulse Height BMI (Body Mass Index)             94 1.803 m (5' 11\") 25.1 kg/m2          Blood Pressure from Last 3 Encounters:   09/21/17 120/82   04/11/17 115/76   03/27/17 (!) 118/93    Weight from Last 3 Encounters:   09/21/17 81.6 kg (180 lb)   04/11/17 83.8 kg (184 lb 12.8 oz)   03/27/17 83.9 kg (185 lb)               Primary Care Provider    Provider Not In System                Equal Access to Services     [facility] : Hadnino Figueroa, keyla holm, mazin babin, nataliya resendez . So [facility] [phone].    ATENCIÓN: Si habla español, tiene a barkley disposición servicios gratuitos de asistencia lingüística. [facility] [phone].    We comply with applicable federal civil rights laws and [place] laws. We do not discriminate on the basis of race, color, national origin, age, disability sex, sexual orientation or gender identity.            Thank you!     Thank you for choosing [facility]  for your care. Our goal is always to provide you with excellent care. Hearing back from our patients is one way we can continue to improve our services. Please take a few minutes to complete the written survey that you may receive in the mail after your visit with us. Thank you!             Your Updated Medication List - Protect others around you: Learn how to safely use, store and throw away your medicines at www.disposemymeds.org.          This list is accurate as of: 9/21/17 11:56 AM.  Always use your most recent med list.                   Brand Name Dispense Instructions for use Diagnosis    * AMOXICILLIN PO      Reported on 5/9/2017        * amoxicillin 875 MG tablet    AMOXIL    20 tablet    Take 1 tablet (875 mg) by mouth 2 times daily    Throat pain       * FLUoxetine 10 MG tablet    PROzac    90 tablet    Take 1 tablet (10 mg) by mouth daily    Depression, unspecified depression type       * " FLUoxetine 20 MG capsule    PROzac    90 capsule    Take 1 capsule (20 mg) by mouth daily    ALVIN (generalized anxiety disorder)       * FLUoxetine 40 MG capsule    PROzac    93 capsule    Take 1 capsule (40 mg) by mouth daily    ALVIN (generalized anxiety disorder)       levonorgestrel 20 MCG/24HR IUD    MIRENA     1 each by Intrauterine route once        * Notice:  This list has 5 medication(s) that are the same as other medications prescribed for you. Read the directions carefully, and ask your doctor or other care provider to review them with you.

## 2017-09-26 ENCOUNTER — OFFICE VISIT (OUTPATIENT)
Dept: ORTHOPEDICS | Facility: CLINIC | Age: 22
End: 2017-09-26

## 2017-09-26 DIAGNOSIS — M25.572 PAIN IN JOINT, ANKLE AND FOOT, LEFT: Primary | ICD-10-CM

## 2017-09-26 NOTE — PROGRESS NOTES
CHIEF COMPLAINT:     1.  Status post left fifth metatarsal fracture with satisfactory healing.   2.  Left second metatarsal stress fracture.      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  The patient reports to have a 2-week history of increased pain which eventually has been diagnosed as a second metatarsal stress fracture through an MRI.  The patient has been protecting her foot since then.  Reports to have been using the boot for a week, which has helped immensely for her symptoms.  However, she has discontinued the boot while walking around her apartment and the symptoms have returned to some degree.      Denies to have any problems in the fifth metatarsal, although still has some occasional pain.      PHYSICAL EXAMINATION:  On today's exam, she presents with full range of motion of the left ankle, hindfoot and midfoot joints.  CMS is intact.  Skin intact.  Presents with pain with palpation of the second metatarsal.  There is some diffuse swelling.  The fifth metatarsal is unremarkable.      RADIOGRAPHIC EVALUATION:  An MRI was reviewed today which was significant for showing a large amount of signal changes on both T1 and T2 compatible with a stress reaction of the second metatarsal.  This includes intramedullary changes as well as surrounding soft tissue changes.      ASSESSMENT:     1.  Status post left foot fifth metatarsal fracture with satisfactory healing.   2.  Left second metatarsal stress reaction.      PLAN:  I discussed with the patient the natural history of her condition as well as the importance of staying symptom free for the next few weeks in order to improve her discomfort.      The patient will proceed with a progressive return to activities as well as protection.  The patient will be cleared to proceed with practice of golfing and even competing as soon as she is symptom free and she does not have any restrictive devices.       All questions were answered.  She will follow up on a p.r.n. basis.      TT 30 minutes, CT 20 minutes.

## 2017-09-26 NOTE — LETTER
9/26/2017      RE: Ruchi Tobias  [address]  [address]  [place] [address]       CHIEF COMPLAINT:     1.  Status post left fifth metatarsal fracture with satisfactory healing.   2.  Left second metatarsal stress fracture.      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  The patient reports to have a 2-week history of increased pain which eventually has been diagnosed as a second metatarsal stress fracture through an MRI.  The patient has been protecting her foot since then.  Reports to have been using the boot for a week, which has helped immensely for her symptoms.  However, she has discontinued the boot while walking around her apartment and the symptoms have returned to some degree.      Denies to have any problems in the fifth metatarsal, although still has some occasional pain.      PHYSICAL EXAMINATION:  On today's exam, she presents with full range of motion of the left ankle, hindfoot and midfoot joints.  CMS is intact.  Skin intact.  Presents with pain with palpation of the second metatarsal.  There is some diffuse swelling.  The fifth metatarsal is unremarkable.      RADIOGRAPHIC EVALUATION:  An MRI was reviewed today which was significant for showing a large amount of signal changes on both T1 and T2 compatible with a stress reaction of the second metatarsal.  This includes intramedullary changes as well as surrounding soft tissue changes.      ASSESSMENT:     1.  Status post left foot fifth metatarsal fracture with satisfactory healing.   2.  Left second metatarsal stress reaction.      PLAN:  I discussed with the patient the natural history of her condition as well as the importance of staying symptom free for the next few weeks in order to improve her discomfort.      The patient will proceed with a progressive return to activities as well as protection.  The patient will be cleared to proceed with practice of  golfing and even competing as soon as she is symptom free and she does not have any restrictive devices.      All questions were answered.  She will follow up on a p.r.n. basis.      TT 30 minutes, CT 20 minutes.         Shakeel Lynch MD

## 2017-09-26 NOTE — MR AVS SNAPSHOT
After Visit Summary   9/26/2017    Ruchi Tobias    MRN: [id number]           Patient Information     Date Of Birth          1995        Visit Information        Provider Department      9/26/2017 4:00 PM Shakeel Lynch MD [facility]        Today's Diagnoses     Pain in joint, ankle and foot, left    -  1       Follow-ups after your visit        Who to contact     Please call your clinic at [phone] to:    Ask questions about your health    Make or cancel appointments    Discuss your medicines    Learn about your test results    Speak to your doctor   If you have compliments or concerns about an experience at your clinic, or if you wish to file a complaint, please contact [facility] Patient Relations at [phone] or email us at [email or web address]         Additional Information About Your Visit        MyChart Information     [employer] gives you secure access to your electronic health record. If you see a primary care provider, you can also send messages to your care team and make appointments. If you have questions, please call your primary care clinic.  If you do not have a primary care provider, please call [phone] and they will assist you.      Beaker is an electronic gateway that provides easy, online access to your medical records. With [employer], you can request a clinic appointment, read your test results, renew a prescription or communicate with your care team.     To access your existing account, please contact your [facility] or call [phone] for assistance.        Care EveryWhere ID     This is your Care EveryWhere ID. This could be used by other organizations to access your [facility] medical records  [id number]         Blood Pressure from Last 3 Encounters:   09/21/17 120/82   04/11/17 115/76   03/27/17 (!) 118/93    Weight from Last 3 Encounters:   09/21/17 81.6 kg  (180 lb)   04/11/17 83.8 kg (184 lb 12.8 oz)   03/27/17 83.9 kg (185 lb)              Today, you had the following     No orders found for display       Primary Care Provider    None Specified       No primary provider on file.        Equal Access to Services     IKER NÚÑEZ : Hadii aad ku hadgarryraquel Soetta, wacholoda luqadaha, qaybta kaalmada kathyyanique, waxdee flaquita ariannasawyer de jesus ectordinorah petersen. So [facility] [phone].    ATENCIÓN: Si habla español, tiene a barkley disposición servicios gratuitos de asistencia lingüística. Llame al [phone].    We comply with applicable federal civil rights laws and [place] laws. We do not discriminate on the basis of race, color, national origin, age, disability, sex, sexual orientation, or gender identity.            Thank you!     Thank you for choosing [facility]  for your care. Our goal is always to provide you with excellent care. Hearing back from our patients is one way we can continue to improve our services. Please take a few minutes to complete the written survey that you may receive in the mail after your visit with us. Thank you!             Your Updated Medication List - Protect others around you: Learn how to safely use, store and throw away your medicines at www.disposemymeds.org.          This list is accurate as of: 9/26/17 11:59 PM.  Always use your most recent med list.                   Brand Name Dispense Instructions for use Diagnosis    * AMOXICILLIN PO      Reported on 5/9/2017        * amoxicillin 875 MG tablet    AMOXIL    20 tablet    Take 1 tablet (875 mg) by mouth 2 times daily    Throat pain       * FLUoxetine 10 MG tablet    PROzac    90 tablet    Take 1 tablet (10 mg) by mouth daily    Depression, unspecified depression type       * FLUoxetine 20 MG capsule    PROzac    90 capsule    Take 1 capsule (20 mg) by mouth daily    ALVIN (generalized anxiety disorder)       * FLUoxetine 40 MG capsule    PROzac    93 capsule    Take 1 capsule  (40 mg) by mouth daily    ALVIN (generalized anxiety disorder)       levonorgestrel 20 MCG/24HR IUD    MIRENA     1 each by Intrauterine route once        * Notice:  This list has 5 medication(s) that are the same as other medications prescribed for you. Read the directions carefully, and ask your doctor or other care provider to review them with you.

## 2017-09-26 NOTE — LETTER
Date:October 3, 2017      Patient was self referred, no letter generated. Do not send.        [facility] Health Information

## 2017-11-22 ENCOUNTER — TRANSFERRED RECORDS (OUTPATIENT)
Dept: HEALTH INFORMATION MANAGEMENT | Facility: CLINIC | Age: 22
End: 2017-11-22

## 2017-11-22 LAB — PAP-ABSTRACT: NORMAL

## 2017-12-24 ENCOUNTER — HEALTH MAINTENANCE LETTER (OUTPATIENT)
Age: 22
End: 2017-12-24

## 2018-03-27 ENCOUNTER — OFFICE VISIT (OUTPATIENT)
Dept: FAMILY MEDICINE | Facility: CLINIC | Age: 23
End: 2018-03-27
Payer: COMMERCIAL

## 2018-03-27 VITALS
HEART RATE: 74 BPM | SYSTOLIC BLOOD PRESSURE: 129 MMHG | BODY MASS INDEX: 28.28 KG/M2 | HEIGHT: 71 IN | WEIGHT: 202 LBS | DIASTOLIC BLOOD PRESSURE: 87 MMHG

## 2018-03-27 DIAGNOSIS — F41.1 GAD (GENERALIZED ANXIETY DISORDER): Primary | ICD-10-CM

## 2018-03-27 NOTE — LETTER
Date:April 25, 2018      Patient was self referred, no letter generated. Do not send.        [facility] Health Information

## 2018-03-27 NOTE — LETTER
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    -Stopped Prozac 40 mg q day at the end of summer.  Wasn't great about taking it over the summer.  No side effects.   -Dr. Daja Ling recommends that she goes back on it.   -Apathy and anxiety. More than previously  -Getting in the way of playing golf  -Sleeping well  -Eating fine  -No SI or HI      O:  NAD  /87  Pulse 74  Ht 5' 11\" (1.803 m)  Wt 202 lb (91.6 kg)  LMP  (LMP Unknown)  BMI 28.17 kg/m2  Good eye contact  Normal thought content  Appropriately groomed      A: ALVIN with some depressive features    P:  Restart Prozac at 20mg qday for one week and then alternate 1 tab vs 2 tabs per day for one week.  Then increase to 2 pills per day.  Recheck in 4 weeks or sooner if needed.  COntinue to see Dr. Daja Ling, Psychologist.    Laverne Montague MD, CAQ, FACSM, CCD  [facility]  Sports Medicine and Bone Health  Team Physician;  Athletics          Laverne Montague MD    "

## 2018-03-27 NOTE — PROGRESS NOTES
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    -Stopped Prozac 40 mg q day at the end of summer.  Wasn't great about taking it over the summer.  No side effects.   -Dr. Daja Ling recommends that she goes back on it.   -Apathy and anxiety. More than previously  -Getting in the way of playing golf  -Sleeping well  -Eating fine  -No SI or HI      O:  NAD  /87  Pulse 74  Ht 5' 11\" (1.803 m)  Wt 202 lb (91.6 kg)  LMP  (LMP Unknown)  BMI 28.17 kg/m2  Good eye contact  Normal thought content  Appropriately groomed      A: ALVIN with some depressive features    P:  Restart Prozac at 20mg qday for one week and then alternate 1 tab vs 2 tabs per day for one week.  Then increase to 2 pills per day.  Recheck in 4 weeks or sooner if needed.  COntinue to see Dr. Daja Ling, Psychologist.    Laverne Montague MD, CAQ, FACSM, CCD  [facility]  Sports Medicine and Bone Health  Team Physician;  Athletics        "

## 2018-03-27 NOTE — MR AVS SNAPSHOT
"              After Visit Summary   3/27/2018    Ruchi Toibas    MRN: [id number]           Patient Information     Date Of Birth          1995        Visit Information        Provider Department      3/27/2018 4:00 PM Laverne Montague MD [facility]        Today's Diagnoses     ALVIN (generalized anxiety disorder)    -  1       Follow-ups after your visit        Who to contact     Please call your clinic at [phone] to:    Ask questions about your health    Make or cancel appointments    Discuss your medicines    Learn about your test results    Speak to your doctor            Additional Information About Your Visit        MyChart Information     [employer] gives you secure access to your electronic health record. If you see a primary care provider, you can also send messages to your care team and make appointments. If you have questions, please call your primary care clinic.  If you do not have a primary care provider, please call [phone] and they will assist you.      [employer] is an electronic gateway that provides easy, online access to your medical records. With [employer], you can request a clinic appointment, read your test results, renew a prescription or communicate with your care team.     To access your existing account, please contact your [facility] or call [phone] for assistance.        Care EveryWhere ID     This is your Care EveryWhere ID. This could be used by other organizations to access your [place] medical records  [id number]        Your Vitals Were     Pulse Height Last Period BMI (Body Mass Index)          74 5' 11\" (1.803 m) (LMP Unknown) 28.17 kg/m2         Blood Pressure from Last 3 Encounters:   03/27/18 129/87   09/21/17 120/82   04/11/17 115/76    Weight from Last 3 Encounters:   03/27/18 202 lb (91.6 kg)   09/21/17 180 lb (81.6 kg)   04/11/17 184 lb 12.8 oz (83.8 kg)              Today, you had the " following     No orders found for display         Today's Medication Changes          These changes are accurate as of 3/27/18 11:59 PM.  If you have any questions, ask your nurse or doctor.               These medicines have changed or have updated prescriptions.        Dose/Directions    * FLUoxetine 10 MG tablet   Commonly known as:  PROzac   This may have changed:  Another medication with the same name was added. Make sure you understand how and when to take each.   Used for:  Depression, unspecified depression type        Dose:  10 mg   Take 1 tablet (10 mg) by mouth daily   Quantity:  90 tablet   Refills:  1       * FLUoxetine 20 MG capsule   Commonly known as:  PROzac   This may have changed:  Another medication with the same name was added. Make sure you understand how and when to take each.   Used for:  ALVIN (generalized anxiety disorder)        Dose:  20 mg   Take 1 capsule (20 mg) by mouth daily   Quantity:  90 capsule   Refills:  1       * FLUoxetine 40 MG capsule   Commonly known as:  PROzac   This may have changed:  Another medication with the same name was added. Make sure you understand how and when to take each.   Used for:  ALVIN (generalized anxiety disorder)        Dose:  40 mg   Take 1 capsule (40 mg) by mouth daily   Quantity:  93 capsule   Refills:  1       * FLUoxetine 20 MG capsule   Commonly known as:  PROzac   This may have changed:  You were already taking a medication with the same name, and this prescription was added. Make sure you understand how and when to take each.   Used for:  ALVIN (generalized anxiety disorder)        Dose:  40 mg   Take 2 capsules (40 mg) by mouth daily   Quantity:  62 capsule   Refills:  1       * Notice:  This list has 4 medication(s) that are the same as other medications prescribed for you. Read the directions carefully, and ask your doctor or other care provider to review them with you.         Where to get your medicines      These medications were sent to [facility]  [id number] - [place], [place] - [address]  [address], [facility] [address]     Phone:  [phone]     FLUoxetine 20 MG capsule                Primary Care Provider    None Specified       No primary provider on file.        Equal Access to Services     IKER NÚÑEZ : Hadii aad ku hadgarryraquel Soetta, waaxda luqadaha, qaybta kaalmada adeneyada, nataliya osmaniin hayaasawyer santiagoradhadinorah petersen. So [facility] [phone].    ATENCIÓN: Si habla español, tiene a barkley disposición servicios gratuitos de asistencia lingüística. Llame al [phone].    We comply with applicable federal civil rights laws and [place] laws. We do not discriminate on the basis of race, color, national origin, age, disability, sex, sexual orientation, or gender identity.            Thank you!     Thank you for choosing [facility]  for your care. Our goal is always to provide you with excellent care. Hearing back from our patients is one way we can continue to improve our services. Please take a few minutes to complete the written survey that you may receive in the mail after your visit with us. Thank you!             Your Updated Medication List - Protect others around you: Learn how to safely use, store and throw away your medicines at www.disposemymeds.org.          This list is accurate as of 3/27/18 11:59 PM.  Always use your most recent med list.                   Brand Name Dispense Instructions for use Diagnosis    * AMOXICILLIN PO      Reported on 5/9/2017        * amoxicillin 875 MG tablet    AMOXIL    20 tablet    Take 1 tablet (875 mg) by mouth 2 times daily    Throat pain       * FLUoxetine 10 MG tablet    PROzac    90 tablet    Take 1 tablet (10 mg) by mouth daily    Depression, unspecified depression type       * FLUoxetine 20 MG capsule    PROzac    90 capsule    Take 1 capsule (20 mg) by mouth daily    ALVIN (generalized anxiety disorder)       * FLUoxetine 40 MG capsule    PROzac    93 capsule     Take 1 capsule (40 mg) by mouth daily    ALVIN (generalized anxiety disorder)       * FLUoxetine 20 MG capsule    PROzac    62 capsule    Take 2 capsules (40 mg) by mouth daily    ALVIN (generalized anxiety disorder)       levonorgestrel 20 MCG/24HR IUD    MIRENA     1 each by Intrauterine route once        * Notice:  This list has 6 medication(s) that are the same as other medications prescribed for you. Read the directions carefully, and ask your doctor or other care provider to review them with you.

## 2018-04-17 DIAGNOSIS — Z20.828 EXPOSURE TO INFLUENZA: Primary | ICD-10-CM

## 2018-04-17 RX ORDER — OSELTAMIVIR PHOSPHATE 75 MG/1
75 CAPSULE ORAL DAILY
Qty: 10 CAPSULE | Refills: 0 | Status: SHIPPED | OUTPATIENT
Start: 2018-04-17

## 2018-06-14 ENCOUNTER — OFFICE VISIT (OUTPATIENT)
Dept: FAMILY MEDICINE | Facility: CLINIC | Age: 23
End: 2018-06-14
Payer: COMMERCIAL

## 2018-06-14 DIAGNOSIS — F43.22 ADJUSTMENT DISORDER WITH ANXIOUS MOOD: Primary | ICD-10-CM

## 2018-06-14 RX ORDER — FLUOXETINE 40 MG/1
80 CAPSULE ORAL DAILY
Qty: 60 CAPSULE | Refills: 3 | Status: SHIPPED | OUTPATIENT
Start: 2018-06-14 | End: 2018-11-06

## 2018-06-14 NOTE — LETTER
Date:July 6, 2018      Patient was self referred, no letter generated. Do not send.        [facility] Health Information

## 2018-06-14 NOTE — LETTER
6/14/2018      RE: Ruchi Tobias  [address]  [address]  [facility] [address]       HPI  In for f/u on anxiety and depression.  Was on prozac and dc'd on her own.  Sxs worse again with low mood and desire to hang out.  Would like to restart meds.  No SI or other concerns.    Review of Systems   Constitutional: Negative.    HENT: Negative.    Cardiovascular: Negative.    Neurological: Negative.    Psychiatric/Behavioral: Positive for depression. Negative for hallucinations, substance abuse and suicidal ideas. The patient is nervous/anxious. The patient does not have insomnia.          Physical Exam   Constitutional: She is oriented to person, place, and time and well-developed, well-nourished, and in no distress.   HENT:   Head: Normocephalic and atraumatic.   Cardiovascular: Normal rate, regular rhythm and normal heart sounds.    No murmur heard.  Pulmonary/Chest: Effort normal and breath sounds normal. No respiratory distress. She has no wheezes.   Neurological: She is alert and oriented to person, place, and time.   Psychiatric: Mood, memory, affect and judgment normal.       Depression  -restart prozac at 80 mg qd  -f/u in 1 month sooner if any concerns  -f/u with sports psych    Josse York MD

## 2018-06-14 NOTE — MR AVS SNAPSHOT
After Visit Summary   6/14/2018    Ruchi Tobias    MRN: [id number]           Patient Information     Date Of Birth          1995        Visit Information        Provider Department      6/14/2018 11:30 AM Josse York MD [facility]        Today's Diagnoses     Adjustment disorder with anxious mood    -  1       Follow-ups after your visit        Who to contact     Please call your clinic at [phone] to:    Ask questions about your health    Make or cancel appointments    Discuss your medicines    Learn about your test results    Speak to your doctor            Additional Information About Your Visit        MyChart Information     [employer] gives you secure access to your electronic health record. If you see a primary care provider, you can also send messages to your care team and make appointments. If you have questions, please call your primary care clinic.  If you do not have a primary care provider, please call [phone] and they will assist you.      [employer] is an electronic gateway that provides easy, online access to your medical records. With [employer], you can request a clinic appointment, read your test results, renew a prescription or communicate with your care team.     To access your existing account, please contact your [facility] or call [phone] for assistance.        Care EveryWhere ID     This is your Care EveryWhere ID. This could be used by other organizations to access your [place] medical records  [id number]         Blood Pressure from Last 3 Encounters:   06/14/18 (P) 133/86   03/27/18 129/87   09/21/17 120/82    Weight from Last 3 Encounters:   06/14/18 (P) 87.3 kg (192 lb 6.4 oz)   03/27/18 91.6 kg (202 lb)   09/21/17 81.6 kg (180 lb)              Today, you had the following     No orders found for display         Today's Medication Changes          These changes are accurate as of 6/14/18 11:59 PM.   If you have any questions, ask your nurse or doctor.               These medicines have changed or have updated prescriptions.        Dose/Directions    * FLUoxetine 10 MG tablet   Commonly known as:  PROzac   This may have changed:  Another medication with the same name was added. Make sure you understand how and when to take each.   Used for:  Depression, unspecified depression type        Dose:  10 mg   Take 1 tablet (10 mg) by mouth daily   Quantity:  90 tablet   Refills:  1       * FLUoxetine 20 MG capsule   Commonly known as:  PROzac   This may have changed:  Another medication with the same name was added. Make sure you understand how and when to take each.   Used for:  ALVIN (generalized anxiety disorder)        Dose:  20 mg   Take 1 capsule (20 mg) by mouth daily   Quantity:  90 capsule   Refills:  1       * FLUoxetine 40 MG capsule   Commonly known as:  PROzac   This may have changed:  Another medication with the same name was added. Make sure you understand how and when to take each.   Used for:  ALVIN (generalized anxiety disorder)        Dose:  40 mg   Take 1 capsule (40 mg) by mouth daily   Quantity:  93 capsule   Refills:  1       * FLUoxetine 20 MG capsule   Commonly known as:  PROzac   This may have changed:  Another medication with the same name was added. Make sure you understand how and when to take each.   Used for:  ALVIN (generalized anxiety disorder)        Dose:  40 mg   Take 2 capsules (40 mg) by mouth daily   Quantity:  62 capsule   Refills:  1       * FLUoxetine 40 MG capsule   Commonly known as:  PROzac   This may have changed:  You were already taking a medication with the same name, and this prescription was added. Make sure you understand how and when to take each.   Used for:  Adjustment disorder with anxious mood        Dose:  80 mg   Take 2 capsules (80 mg) by mouth daily   Quantity:  60 capsule   Refills:  3       * Notice:  This list has 5 medication(s) that are the same as other  medications prescribed for you. Read the directions carefully, and ask your doctor or other care provider to review them with you.         Where to get your medicines      These medications were sent to [id number] - [place], [place] - [address]  [address] , [facility] [address]     Phone:  [phone]     FLUoxetine 40 MG capsule                Primary Care Provider    None Specified       No primary provider on file.        Equal Access to Services     [facility]: Hadii aad ku hadasho Soomaali, waaxda luqadaha, qaybta kaalmada adeegyada, waxay osmaniin hayaan adene santiagoradhadinorah resendez . So [facility] [phone].    ATENCIÓN: Si habla español, tiene a barkley disposición servicios gratuitos de asistencia lingüística. Fosterame al [phone].    We comply with applicable federal civil rights laws and [place] laws. We do not discriminate on the basis of race, color, national origin, age, disability, sex, sexual orientation, or gender identity.            Thank you!     Thank you for choosing [facility]  for your care. Our goal is always to provide you with excellent care. Hearing back from our patients is one way we can continue to improve our services. Please take a few minutes to complete the written survey that you may receive in the mail after your visit with us. Thank you!             Your Updated Medication List - Protect others around you: Learn how to safely use, store and throw away your medicines at www.disposemymeds.org.          This list is accurate as of 6/14/18 11:59 PM.  Always use your most recent med list.                   Brand Name Dispense Instructions for use Diagnosis    * AMOXICILLIN PO      Reported on 5/9/2017        * amoxicillin 875 MG tablet    AMOXIL    20 tablet    Take 1 tablet (875 mg) by mouth 2 times daily    Throat pain       * FLUoxetine 10 MG tablet    PROzac    90 tablet    Take 1 tablet (10 mg) by mouth daily    Depression, unspecified  depression type       * FLUoxetine 20 MG capsule    PROzac    90 capsule    Take 1 capsule (20 mg) by mouth daily    ALVIN (generalized anxiety disorder)       * FLUoxetine 40 MG capsule    PROzac    93 capsule    Take 1 capsule (40 mg) by mouth daily    ALVIN (generalized anxiety disorder)       * FLUoxetine 20 MG capsule    PROzac    62 capsule    Take 2 capsules (40 mg) by mouth daily    ALVIN (generalized anxiety disorder)       * FLUoxetine 40 MG capsule    PROzac    60 capsule    Take 2 capsules (80 mg) by mouth daily    Adjustment disorder with anxious mood       levonorgestrel 20 MCG/24HR IUD    MIRENA     1 each by Intrauterine route once        oseltamivir 75 MG capsule    TAMIFLU    10 capsule    Take 1 capsule (75 mg) by mouth daily    Exposure to influenza       * Notice:  This list has 7 medication(s) that are the same as other medications prescribed for you. Read the directions carefully, and ask your doctor or other care provider to review them with you.

## 2018-07-05 ASSESSMENT — ENCOUNTER SYMPTOMS
CARDIOVASCULAR NEGATIVE: 1
HALLUCINATIONS: 0
NEUROLOGICAL NEGATIVE: 1
CONSTITUTIONAL NEGATIVE: 1
NERVOUS/ANXIOUS: 1
DEPRESSION: 1
INSOMNIA: 0

## 2018-07-05 ASSESSMENT — LIFESTYLE VARIABLES: SUBSTANCE_ABUSE: 0

## 2018-07-05 NOTE — PROGRESS NOTES
HPI  In for f/u on anxiety and depression.  Was on prozac and dc'd on her own.  Sxs worse again with low mood and desire to hang out.  Would like to restart meds.  No SI or other concerns.    Review of Systems   Constitutional: Negative.    HENT: Negative.    Cardiovascular: Negative.    Neurological: Negative.    Psychiatric/Behavioral: Positive for depression. Negative for hallucinations, substance abuse and suicidal ideas. The patient is nervous/anxious. The patient does not have insomnia.          Physical Exam   Constitutional: She is oriented to person, place, and time and well-developed, well-nourished, and in no distress.   HENT:   Head: Normocephalic and atraumatic.   Cardiovascular: Normal rate, regular rhythm and normal heart sounds.    No murmur heard.  Pulmonary/Chest: Effort normal and breath sounds normal. No respiratory distress. She has no wheezes.   Neurological: She is alert and oriented to person, place, and time.   Psychiatric: Mood, memory, affect and judgment normal.       Depression  -restart prozac at 80 mg qd  -f/u in 1 month sooner if any concerns  -f/u with sports psych

## 2018-08-23 ENCOUNTER — OFFICE VISIT (OUTPATIENT)
Dept: FAMILY MEDICINE | Facility: CLINIC | Age: 23
End: 2018-08-23
Payer: COMMERCIAL

## 2018-08-23 VITALS
HEIGHT: 71 IN | WEIGHT: 188.2 LBS | SYSTOLIC BLOOD PRESSURE: 120 MMHG | BODY MASS INDEX: 26.35 KG/M2 | DIASTOLIC BLOOD PRESSURE: 88 MMHG | HEART RATE: 81 BPM

## 2018-08-23 DIAGNOSIS — F43.22 ADJUSTMENT DISORDER WITH ANXIOUS MOOD: Primary | ICD-10-CM

## 2018-08-23 ASSESSMENT — ENCOUNTER SYMPTOMS
NERVOUS/ANXIOUS: 1
DEPRESSION: 1
MEMORY LOSS: 0
EYES NEGATIVE: 1
INSOMNIA: 0
RESPIRATORY NEGATIVE: 1
CONSTITUTIONAL NEGATIVE: 1
CARDIOVASCULAR NEGATIVE: 1
HALLUCINATIONS: 0

## 2018-08-23 ASSESSMENT — LIFESTYLE VARIABLES: SUBSTANCE_ABUSE: 0

## 2018-08-23 NOTE — LETTER
8/23/2018      RE: Ruchi Tobias  [address]  [address]  [facility] [address]       HPI  In for f/u of anxiety and panic attacks.  Has had 2-3 episodes this summer.  Different situations with some stress leading to some anxiety.  Has increased medicine to 80mg/day.  At first had more depression and now more anxiety.  Has pos fam hx of this.      Review of Systems   Constitutional: Negative.    Eyes: Negative.    Respiratory: Negative.    Cardiovascular: Negative.    Genitourinary: Negative.    Skin: Negative.    Psychiatric/Behavioral: Positive for depression. Negative for hallucinations, memory loss, substance abuse and suicidal ideas. The patient is nervous/anxious. The patient does not have insomnia.          Physical Exam   Constitutional: She is oriented to person, place, and time and well-developed, well-nourished, and in no distress.   HENT:   Head: Normocephalic and atraumatic.   Neurological: She is alert and oriented to person, place, and time.   Psychiatric: Memory, affect and judgment normal.       Depression/Anxiety  -doing well on increased dose  -realizes stressors  -mood good  -discussed plans for helping with anxiety  -will f/u with Dr. CARTER to discuss some ways to deal with her anxiety on Tuesday        Josse York MD

## 2018-08-23 NOTE — MR AVS SNAPSHOT
"              After Visit Summary   8/23/2018    Ruchi Tobias    MRN: [id number]           Patient Information     Date Of Birth          1995        Visit Information        Provider Department      8/23/2018 11:00 AM Josse York MD [facility]        Today's Diagnoses     Adjustment disorder with anxious mood    -  1       Follow-ups after your visit        Who to contact     Please call your clinic at [phone] to:    Ask questions about your health    Make or cancel appointments    Discuss your medicines    Learn about your test results    Speak to your doctor            Additional Information About Your Visit        MyChart Information     [employer] gives you secure access to your electronic health record. If you see a primary care provider, you can also send messages to your care team and make appointments. If you have questions, please call your primary care clinic.  If you do not have a primary care provider, please call [phone] and they will assist you.      [employer] is an electronic gateway that provides easy, online access to your medical records. With [employer], you can request a clinic appointment, read your test results, renew a prescription or communicate with your care team.     To access your existing account, please contact your [facility] or call [phone] for assistance.        Care EveryWhere ID     This is your Care EveryWhere ID. This could be used by other organizations to access your [place] medical records  [id number]        Your Vitals Were     Pulse Height BMI (Body Mass Index)             81 1.803 m (5' 11\") 26.25 kg/m2          Blood Pressure from Last 3 Encounters:   08/23/18 120/88   06/14/18 (P) 133/86   03/27/18 129/87    Weight from Last 3 Encounters:   08/23/18 85.4 kg (188 lb 3.2 oz)   06/14/18 (P) 87.3 kg (192 lb 6.4 oz)   03/27/18 91.6 kg (202 lb)              Today, you had the following     No orders " found for display       Primary Care Provider    None Specified       No primary provider on file.        Equal Access to Services     ALHAJIANTONIA WILTON : Hadii aad ku hadgarryraquel Figueroa, wachololilian holm, cintiareza augustinkierannataliya garayradhadinorah petersen. So [facility] [phone].    ATENCIÓN: Si habla español, tiene a barkley disposición servicios gratuitos de asistencia lingüística. Llame al [phone].    We comply with applicable federal civil rights laws and [place] laws. We do not discriminate on the basis of race, color, national origin, age, disability, sex, sexual orientation, or gender identity.            Thank you!     Thank you for choosing [facility]  for your care. Our goal is always to provide you with excellent care. Hearing back from our patients is one way we can continue to improve our services. Please take a few minutes to complete the written survey that you may receive in the mail after your visit with us. Thank you!             Your Updated Medication List - Protect others around you: Learn how to safely use, store and throw away your medicines at www.disposemymeds.org.          This list is accurate as of 8/23/18 11:13 AM.  Always use your most recent med list.                   Brand Name Dispense Instructions for use Diagnosis    * AMOXICILLIN PO      Reported on 5/9/2017        * amoxicillin 875 MG tablet    AMOXIL    20 tablet    Take 1 tablet (875 mg) by mouth 2 times daily    Throat pain       * FLUoxetine 10 MG tablet    PROzac    90 tablet    Take 1 tablet (10 mg) by mouth daily    Depression, unspecified depression type       * FLUoxetine 20 MG capsule    PROzac    90 capsule    Take 1 capsule (20 mg) by mouth daily    ALVIN (generalized anxiety disorder)       * FLUoxetine 40 MG capsule    PROzac    93 capsule    Take 1 capsule (40 mg) by mouth daily    ALVIN (generalized anxiety disorder)       * FLUoxetine 20 MG capsule    PROzac    62 capsule    Take 2  capsules (40 mg) by mouth daily    ALVIN (generalized anxiety disorder)       * FLUoxetine 40 MG capsule    PROzac    60 capsule    Take 2 capsules (80 mg) by mouth daily    Adjustment disorder with anxious mood       levonorgestrel 20 MCG/24HR IUD    MIRENA     1 each by Intrauterine route once        oseltamivir 75 MG capsule    TAMIFLU    10 capsule    Take 1 capsule (75 mg) by mouth daily    Exposure to influenza       * Notice:  This list has 7 medication(s) that are the same as other medications prescribed for you. Read the directions carefully, and ask your doctor or other care provider to review them with you.

## 2018-08-23 NOTE — PROGRESS NOTES
HPI  In for f/u of anxiety and panic attacks.  Has had 2-3 episodes this summer.  Different situations with some stress leading to some anxiety.  Has increased medicine to 80mg/day.  At first had more depression and now more anxiety.  Has pos fam hx of this.      Review of Systems   Constitutional: Negative.    Eyes: Negative.    Respiratory: Negative.    Cardiovascular: Negative.    Genitourinary: Negative.    Skin: Negative.    Psychiatric/Behavioral: Positive for depression. Negative for hallucinations, memory loss, substance abuse and suicidal ideas. The patient is nervous/anxious. The patient does not have insomnia.          Physical Exam   Constitutional: She is oriented to person, place, and time and well-developed, well-nourished, and in no distress.   HENT:   Head: Normocephalic and atraumatic.   Neurological: She is alert and oriented to person, place, and time.   Psychiatric: Memory, affect and judgment normal.       Depression/Anxiety  -doing well on increased dose  -realizes stressors  -mood good  -discussed plans for helping with anxiety  -will f/u with Dr. CARTER to discuss some ways to deal with her anxiety on Tuesday

## 2018-08-23 NOTE — LETTER
Date:August 24, 2018      Patient was self referred, no letter generated. Do not send.        [facility] Health Information

## 2018-10-04 ENCOUNTER — OFFICE VISIT (OUTPATIENT)
Dept: FAMILY MEDICINE | Facility: CLINIC | Age: 23
End: 2018-10-04
Payer: COMMERCIAL

## 2018-10-04 VITALS
SYSTOLIC BLOOD PRESSURE: 115 MMHG | HEIGHT: 71 IN | WEIGHT: 191 LBS | BODY MASS INDEX: 26.74 KG/M2 | DIASTOLIC BLOOD PRESSURE: 73 MMHG | HEART RATE: 80 BPM

## 2018-10-04 DIAGNOSIS — F43.22 ADJUSTMENT DISORDER WITH ANXIOUS MOOD: Primary | ICD-10-CM

## 2018-10-04 ASSESSMENT — ENCOUNTER SYMPTOMS
MEMORY LOSS: 0
RESPIRATORY NEGATIVE: 1
INSOMNIA: 0
DEPRESSION: 1
NERVOUS/ANXIOUS: 1
CONSTITUTIONAL NEGATIVE: 1
HALLUCINATIONS: 0
NEUROLOGICAL NEGATIVE: 1
CARDIOVASCULAR NEGATIVE: 1

## 2018-10-04 ASSESSMENT — LIFESTYLE VARIABLES: SUBSTANCE_ABUSE: 0

## 2018-10-04 NOTE — LETTER
Date:October 5, 2018      Patient was self referred, no letter generated. Do not send.        [facility] Information

## 2018-10-04 NOTE — MR AVS SNAPSHOT
"              After Visit Summary   10/4/2018    Ruchi Tobias    MRN: [id number]           Patient Information     Date Of Birth          1995        Visit Information        Provider Department      10/4/2018 10:15 AM Josse York MD [facility]        Today's Diagnoses     Adjustment disorder with anxious mood    -  1       Follow-ups after your visit        Who to contact     Please call your clinic at [phone] to:    Ask questions about your health    Make or cancel appointments    Discuss your medicines    Learn about your test results    Speak to your doctor            Additional Information About Your Visit        MyChart Information     [employer] gives you secure access to your electronic health record. If you see a primary care provider, you can also send messages to your care team and make appointments. If you have questions, please call your primary care clinic.  If you do not have a primary care provider, please call [phone] and they will assist you.      [employer] is an electronic gateway that provides easy, online access to your medical records. With [employer], you can request a clinic appointment, read your test results, renew a prescription or communicate with your care team.     To access your existing account, please contact your [facility] or call [phone] for assistance.        Care EveryWhere ID     This is your Care EveryWhere ID. This could be used by other organizations to access your [facility] medical records  [id number]        Your Vitals Were     Pulse Height BMI (Body Mass Index)             80 1.803 m (5' 11\") 26.64 kg/m2          Blood Pressure from Last 3 Encounters:   10/04/18 115/73   08/23/18 120/88   06/14/18 (P) 133/86    Weight from Last 3 Encounters:   10/04/18 86.6 kg (191 lb)   08/23/18 85.4 kg (188 lb 3.2 oz)   06/14/18 (P) 87.3 kg (192 lb 6.4 oz)              Today, you had the following     No orders " found for display       Primary Care Provider    None Specified       No primary provider on file.        Equal Access to Services     ALHAJIANTONIA WILTON : Hadii aad ku hadgarryraquel Figueroa, wachololilian holm, cintiareza augustinkierannataliya garayradhadinorah petersen. So [facility] [phone].    ATENCIÓN: Si habla español, tiene a barkley disposición servicios gratuitos de asistencia lingüística. Llame al [phone].    We comply with applicable federal civil rights laws and [place] laws. We do not discriminate on the basis of race, color, national origin, age, disability, sex, sexual orientation, or gender identity.            Thank you!     Thank you for choosing [facility]  for your care. Our goal is always to provide you with excellent care. Hearing back from our patients is one way we can continue to improve our services. Please take a few minutes to complete the written survey that you may receive in the mail after your visit with us. Thank you!             Your Updated Medication List - Protect others around you: Learn how to safely use, store and throw away your medicines at www.disposemymeds.org.          This list is accurate as of 10/4/18  4:34 PM.  Always use your most recent med list.                   Brand Name Dispense Instructions for use Diagnosis    * AMOXICILLIN PO      Reported on 5/9/2017        * amoxicillin 875 MG tablet    AMOXIL    20 tablet    Take 1 tablet (875 mg) by mouth 2 times daily    Throat pain       * FLUoxetine 10 MG tablet    PROzac    90 tablet    Take 1 tablet (10 mg) by mouth daily    Depression, unspecified depression type       * FLUoxetine 20 MG capsule    PROzac    90 capsule    Take 1 capsule (20 mg) by mouth daily    ALVIN (generalized anxiety disorder)       * FLUoxetine 40 MG capsule    PROzac    93 capsule    Take 1 capsule (40 mg) by mouth daily    ALVIN (generalized anxiety disorder)       * FLUoxetine 20 MG capsule    PROzac    62 capsule    Take 2  capsules (40 mg) by mouth daily    ALVIN (generalized anxiety disorder)       * FLUoxetine 40 MG capsule    PROzac    60 capsule    Take 2 capsules (80 mg) by mouth daily    Adjustment disorder with anxious mood       levonorgestrel 20 MCG/24HR IUD    MIRENA     1 each by Intrauterine route once        oseltamivir 75 MG capsule    TAMIFLU    10 capsule    Take 1 capsule (75 mg) by mouth daily    Exposure to influenza       * Notice:  This list has 7 medication(s) that are the same as other medications prescribed for you. Read the directions carefully, and ask your doctor or other care provider to review them with you.

## 2018-10-04 NOTE — PROGRESS NOTES
GLADYS  Emili is in for f/u of anxiety and depression.  She has not been taking her meds on a very consist basis and some times was doubling up if missing it.  Has still felt low mood.  No SI.  Feels like simple things have become harder to do.  Has told her coaches which feels good to her.  No other new issues.    Review of Systems   Constitutional: Negative.    Respiratory: Negative.    Cardiovascular: Negative.    Neurological: Negative.    Psychiatric/Behavioral: Positive for depression. Negative for hallucinations, memory loss, substance abuse and suicidal ideas. The patient is nervous/anxious. The patient does not have insomnia.          Physical Exam   Constitutional: She is well-developed, well-nourished, and in no distress. No distress.   HENT:   Head: Normocephalic and atraumatic.   Skin: She is not diaphoretic.   Psychiatric: Memory, affect and judgment normal.       Anxiety/Depression  -will start regular use of zoloft 80mg  -DBT therapy to start soon  -will meet with Coaches next week

## 2018-10-04 NOTE — LETTER
10/4/2018      RE: Ruchi Tobias  [address]  [address]  [place] [place] [facility] [address]       [facility]  Emili is in for f/u of anxiety and depression.  She has not been taking her meds on a very consist basis and some times was doubling up if missing it.  Has still felt low mood.  No SI.  Feels like simple things have become harder to do.  Has told her coaches which feels good to her.  No other new issues.    Review of Systems   Constitutional: Negative.    Respiratory: Negative.    Cardiovascular: Negative.    Neurological: Negative.    Psychiatric/Behavioral: Positive for depression. Negative for hallucinations, memory loss, substance abuse and suicidal ideas. The patient is nervous/anxious. The patient does not have insomnia.          Physical Exam   Constitutional: She is well-developed, well-nourished, and in no distress. No distress.   HENT:   Head: Normocephalic and atraumatic.   Skin: She is not diaphoretic.   Psychiatric: Memory, affect and judgment normal.       Anxiety/Depression  -will start regular use of zoloft 80mg  -DBT therapy to start soon  -will meet with Coaches next week      Josse York MD

## 2018-10-11 ENCOUNTER — OFFICE VISIT (OUTPATIENT)
Dept: FAMILY MEDICINE | Facility: CLINIC | Age: 23
End: 2018-10-11
Payer: COMMERCIAL

## 2018-10-11 DIAGNOSIS — F43.22 ADJUSTMENT DISORDER WITH ANXIOUS MOOD: Primary | ICD-10-CM

## 2018-10-11 ASSESSMENT — ENCOUNTER SYMPTOMS
INSOMNIA: 0
CONSTITUTIONAL NEGATIVE: 1
DEPRESSION: 1
HALLUCINATIONS: 0
NERVOUS/ANXIOUS: 1
NEUROLOGICAL NEGATIVE: 1

## 2018-10-11 ASSESSMENT — LIFESTYLE VARIABLES: SUBSTANCE_ABUSE: 0

## 2018-10-11 NOTE — LETTER
10/11/2018      RE: Ruchi Tobias  [address]  [address]  [place] [address]       HPI  Had meeting with Dr. EMMA Mock Emily ATC and her Coaches.  25 min visit to discuss how things are going and a plan going forward. Talked about meds and counseling.  Everyone discussed their concerns.    Review of Systems   Constitutional: Negative.    Neurological: Negative.    Psychiatric/Behavioral: Positive for depression. Negative for hallucinations, substance abuse and suicidal ideas. The patient is nervous/anxious. The patient does not have insomnia.          Physical Exam   Constitutional: She is well-developed, well-nourished, and in no distress.   Neurological: She is alert.   Psychiatric: Mood, memory, affect and judgment normal.       Depression/Anxiety  -continue current plan and meds  -weekly  check ins  -will follow on a regular basis    Josse York MD

## 2018-10-11 NOTE — MR AVS SNAPSHOT
After Visit Summary   10/11/2018    Ruchi Tobias    MRN: [id number]           Patient Information     Date Of Birth          1995        Visit Information        Provider Department      10/11/2018 10:15 AM Josse York MD [facility]        Today's Diagnoses     Adjustment disorder with anxious mood    -  1       Follow-ups after your visit        Who to contact     Please call your clinic at [phone] to:    Ask questions about your health    Make or cancel appointments    Discuss your medicines    Learn about your test results    Speak to your doctor            Additional Information About Your Visit        MyChart Information     [email or web address] gives you secure access to your electronic health record. If you see a primary care provider, you can also send messages to your care team and make appointments. If you have questions, please call your primary care clinic.  If you do not have a primary care provider, please call [phone] and they will assist you.      [email or web address] is an electronic gateway that provides easy, online access to your medical records. With [email or web address], you can request a clinic appointment, read your test results, renew a prescription or communicate with your care team.     To access your existing account, please contact your [facility] or call [phone] for assistance.        Care EveryWhere ID     This is your Care EveryWhere ID. This could be used by other organizations to access your [facility] medical records  [id number]         Blood Pressure from Last 3 Encounters:   10/04/18 115/73   08/23/18 120/88   06/14/18 (P) 133/86    Weight from Last 3 Encounters:   10/04/18 86.6 kg (191 lb)   08/23/18 85.4 kg (188 lb 3.2 oz)   06/14/18 (P) 87.3 kg (192 lb 6.4 oz)              Today, you had the following     No orders found for display       Primary Care Provider    None Specified       No primary provider on file.         Equal Access to Services     [facility] : Hadii aad ku hadgarryraquel Omerali, wacholoda luqadaha, qamedinata kakierannataliya garay. So [facility] [phone].    ATENCIÓN: Si habla español, tiene a barkley disposición servicios gratuitos de asistencia lingüística. Llame al [phone].    We comply with applicable federal civil rights laws and [place] laws. We do not discriminate on the basis of race, color, national origin, age, disability, sex, sexual orientation, or gender identity.            Thank you!     Thank you for choosing [facility]  for your care. Our goal is always to provide you with excellent care. Hearing back from our patients is one way we can continue to improve our services. Please take a few minutes to complete the written survey that you may receive in the mail after your visit with us. Thank you!             Your Updated Medication List - Protect others around you: Learn how to safely use, store and throw away your medicines at www.disposemymeds.org.          This list is accurate as of 10/11/18  3:53 PM.  Always use your most recent med list.                   Brand Name Dispense Instructions for use Diagnosis    * AMOXICILLIN PO      Reported on 5/9/2017        * amoxicillin 875 MG tablet    AMOXIL    20 tablet    Take 1 tablet (875 mg) by mouth 2 times daily    Throat pain       * FLUoxetine 10 MG tablet    PROzac    90 tablet    Take 1 tablet (10 mg) by mouth daily    Depression, unspecified depression type       * FLUoxetine 20 MG capsule    PROzac    90 capsule    Take 1 capsule (20 mg) by mouth daily    ALVIN (generalized anxiety disorder)       * FLUoxetine 40 MG capsule    PROzac    93 capsule    Take 1 capsule (40 mg) by mouth daily    ALVIN (generalized anxiety disorder)       * FLUoxetine 20 MG capsule    PROzac    62 capsule    Take 2 capsules (40 mg) by mouth daily    ALVIN (generalized anxiety disorder)       * FLUoxetine 40 MG capsule     PROzac    60 capsule    Take 2 capsules (80 mg) by mouth daily    Adjustment disorder with anxious mood       levonorgestrel 20 MCG/24HR IUD    MIRENA     1 each by Intrauterine route once        oseltamivir 75 MG capsule    TAMIFLU    10 capsule    Take 1 capsule (75 mg) by mouth daily    Exposure to influenza       * Notice:  This list has 7 medication(s) that are the same as other medications prescribed for you. Read the directions carefully, and ask your doctor or other care provider to review them with you.

## 2018-10-11 NOTE — PROGRESS NOTES
HPI  Had meeting with Dr. EMMA Mock Emily ATC and her Coaches.  25 min visit to discuss how things are going and a plan going forward. Talked about meds and counseling.  Everyone discussed their concerns.    Review of Systems   Constitutional: Negative.    Neurological: Negative.    Psychiatric/Behavioral: Positive for depression. Negative for hallucinations, substance abuse and suicidal ideas. The patient is nervous/anxious. The patient does not have insomnia.          Physical Exam   Constitutional: She is well-developed, well-nourished, and in no distress.   Neurological: She is alert.   Psychiatric: Mood, memory, affect and judgment normal.       Depression/Anxiety  -continue current plan and meds  -weekly  check ins  -will follow on a regular basis

## 2018-10-11 NOTE — LETTER
Date:October 16, 2018      Patient was self referred, no letter generated. Do not send.        [facility] Information

## 2018-11-06 DIAGNOSIS — F43.22 ADJUSTMENT DISORDER WITH ANXIOUS MOOD: ICD-10-CM

## 2018-11-06 RX ORDER — FLUOXETINE 40 MG/1
80 CAPSULE ORAL DAILY
Qty: 60 CAPSULE | Refills: 1 | Status: SHIPPED | OUTPATIENT
Start: 2018-11-06 | End: 2018-12-13

## 2018-12-13 ENCOUNTER — OFFICE VISIT (OUTPATIENT)
Dept: FAMILY MEDICINE | Facility: CLINIC | Age: 23
End: 2018-12-13
Payer: COMMERCIAL

## 2018-12-13 DIAGNOSIS — F43.22 ADJUSTMENT DISORDER WITH ANXIOUS MOOD: ICD-10-CM

## 2018-12-13 RX ORDER — FLUOXETINE 40 MG/1
80 CAPSULE ORAL DAILY
Qty: 60 CAPSULE | Refills: 1 | Status: CANCELLED | OUTPATIENT
Start: 2018-12-13

## 2018-12-13 RX ORDER — FLUOXETINE 40 MG/1
80 CAPSULE ORAL DAILY
Qty: 60 CAPSULE | Refills: 1 | Status: SHIPPED | OUTPATIENT
Start: 2018-12-13

## 2018-12-13 NOTE — PROGRESS NOTES
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 Has been taking her meds regularly and is not doubling up on doses. Feeling better but having some anxiety due to finals. Some stress with coaches and feeling like people don't quite understand. No SI. Excited to go home for break. Therapy is going well. No other new issues.     O:  There were no vitals taken for this visit.  GEN: well-developed, well-nourished, and in no distress. No distress.   HENT: NCAT. Sclera clear. EOMI.   Psychiatric: Memory, affect and judgment normal.      A/P:  Anxiety/Depression  -continue zoloft 80mg daily (refills sent today)  -follow up in February, sooner if needed    Patient seen and discussed with Dr. Chela Walter MD  Primary Care Sports Medicine Fellow

## 2018-12-13 NOTE — PROGRESS NOTES
During today's visit, I was present in the room to review the history and the pertinent parts of the exam. I agree with the above assessment and plan as documented by the fellow.  Supervising physician: Josse Hook

## 2018-12-13 NOTE — LETTER
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 Has been taking her meds regularly and is not doubling up on doses. Feeling better but having some anxiety due to finals. Some stress with coaches and feeling like people don't quite understand. No SI. Excited to go home for break. Therapy is going well. No other new issues.     O:  There were no vitals taken for this visit.  GEN: well-developed, well-nourished, and in no distress. No distress.   HENT: NCAT. Sclera clear. EOMI.   Psychiatric: Memory, affect and judgment normal.      A/P:  Anxiety/Depression  -continue zoloft 80mg daily (refills sent today)  -follow up in February, sooner if needed    Patient seen and discussed with Dr. Chela Walter MD  Primary Care Sports Medicine Fellow          During today's visit, I was present in the room to review the history and the pertinent parts of the exam. I agree with the above assessment and plan as documented by the fellow.  Supervising physician: Josse York  [facility]      Josse York MD

## 2018-12-13 NOTE — LETTER
Date:December 14, 2018      Patient was self referred, no letter generated. Do not send.        [facility] Health Information

## 2019-02-14 ENCOUNTER — OFFICE VISIT (OUTPATIENT)
Dept: FAMILY MEDICINE | Facility: CLINIC | Age: 24
End: 2019-02-14
Payer: COMMERCIAL

## 2019-02-14 VITALS
HEART RATE: 76 BPM | SYSTOLIC BLOOD PRESSURE: 122 MMHG | WEIGHT: 182 LBS | DIASTOLIC BLOOD PRESSURE: 80 MMHG | BODY MASS INDEX: 25.48 KG/M2 | HEIGHT: 71 IN

## 2019-02-14 DIAGNOSIS — F41.9 ANXIETY: Primary | ICD-10-CM

## 2019-02-14 ASSESSMENT — ENCOUNTER SYMPTOMS
INSOMNIA: 0
HALLUCINATIONS: 0
MEMORY LOSS: 0
NEUROLOGICAL NEGATIVE: 1
NERVOUS/ANXIOUS: 1
CONSTITUTIONAL NEGATIVE: 1

## 2019-02-14 ASSESSMENT — MIFFLIN-ST. JEOR: SCORE: 1681.68

## 2019-02-14 ASSESSMENT — LIFESTYLE VARIABLES: SUBSTANCE_ABUSE: 0

## 2019-02-14 NOTE — PROGRESS NOTES
HPI  In for f/u of mental health issues.  Doing well.  No major issues or episodes.  Taking meds on a daily basis.  No side effects.  School and things are going well.  No other concerns today.    Review of Systems   Constitutional: Negative.    Neurological: Negative.    Psychiatric/Behavioral: Negative for hallucinations, memory loss, substance abuse and suicidal ideas. The patient is nervous/anxious. The patient does not have insomnia.          Physical Exam   Constitutional: She is oriented to person, place, and time. She appears well-developed and well-nourished.   Neurological: She is alert and oriented to person, place, and time.   Psychiatric: She has a normal mood and affect. Her behavior is normal. Judgment and thought content normal.         Ortho Exam    Mental Health Follow up  -doing well with anxiety and panic attacks  -taking meds daily  -meds refilled  -cont counseling  -will f/u in spring sooner if any concerns

## 2019-02-14 NOTE — LETTER
2/14/2019      RE: Ruchi Tobias  [address]  [address]  [place] [address]       HPI  In for f/u of mental health issues.  Doing well.  No major issues or episodes.  Taking meds on a daily basis.  No side effects.  School and things are going well.  No other concerns today.    Review of Systems   Constitutional: Negative.    Neurological: Negative.    Psychiatric/Behavioral: Negative for hallucinations, memory loss, substance abuse and suicidal ideas. The patient is nervous/anxious. The patient does not have insomnia.      Physical Exam   Constitutional: She is oriented to person, place, and time. She appears well-developed and well-nourished.   Neurological: She is alert and oriented to person, place, and time.   Psychiatric: She has a normal mood and affect. Her behavior is normal. Judgment and thought content normal.     Ortho Exam    Mental Health Follow up  -doing well with anxiety and panic attacks  -taking meds daily  -meds refilled  -cont counseling  -will f/u in spring sooner if any concerns    Josse York MD

## 2019-06-07 ENCOUNTER — TELEPHONE (OUTPATIENT)
Dept: FAMILY MEDICINE | Facility: CLINIC | Age: 24
End: 2019-06-07

## 2020-03-10 ENCOUNTER — HEALTH MAINTENANCE LETTER (OUTPATIENT)
Age: 25
End: 2020-03-10

## 2020-12-27 ENCOUNTER — HEALTH MAINTENANCE LETTER (OUTPATIENT)
Age: 25
End: 2020-12-27

## 2021-02-28 ENCOUNTER — HEALTH MAINTENANCE LETTER (OUTPATIENT)
Age: 26
End: 2021-02-28

## 2021-04-24 ENCOUNTER — HEALTH MAINTENANCE LETTER (OUTPATIENT)
Age: 26
End: 2021-04-24

## 2021-10-03 ENCOUNTER — HEALTH MAINTENANCE LETTER (OUTPATIENT)
Age: 26
End: 2021-10-03

## 2022-05-15 ENCOUNTER — HEALTH MAINTENANCE LETTER (OUTPATIENT)
Age: 27
End: 2022-05-15

## 2022-09-11 ENCOUNTER — HEALTH MAINTENANCE LETTER (OUTPATIENT)
Age: 27
End: 2022-09-11

## 2023-06-03 ENCOUNTER — HEALTH MAINTENANCE LETTER (OUTPATIENT)
Age: 28
End: 2023-06-03